# Patient Record
Sex: MALE | Race: WHITE | NOT HISPANIC OR LATINO | Employment: OTHER | URBAN - METROPOLITAN AREA
[De-identification: names, ages, dates, MRNs, and addresses within clinical notes are randomized per-mention and may not be internally consistent; named-entity substitution may affect disease eponyms.]

---

## 2017-01-18 ENCOUNTER — OFFICE VISIT (OUTPATIENT)
Dept: SURGERY | Facility: CLINIC | Age: 57
End: 2017-01-18
Payer: MEDICARE

## 2017-01-18 VITALS
HEIGHT: 68 IN | WEIGHT: 117.75 LBS | HEART RATE: 91 BPM | BODY MASS INDEX: 17.85 KG/M2 | SYSTOLIC BLOOD PRESSURE: 141 MMHG | DIASTOLIC BLOOD PRESSURE: 93 MMHG

## 2017-01-18 DIAGNOSIS — D12.8 ADENOMATOUS RECTAL POLYP: Primary | ICD-10-CM

## 2017-01-18 PROCEDURE — 99999 PR PBB SHADOW E&M-EST. PATIENT-LVL III: CPT | Mod: PBBFAC,,, | Performed by: COLON & RECTAL SURGERY

## 2017-01-18 PROCEDURE — 99024 POSTOP FOLLOW-UP VISIT: CPT | Mod: S$GLB,,, | Performed by: COLON & RECTAL SURGERY

## 2017-01-18 NOTE — PROGRESS NOTES
Patient ID:  Kvng Edwards is a 56 y.o. male     Chief Complaint:   Chief Complaint   Patient presents with    Post-op Evaluation        HPI:12/20/16 transanal excision Path adenoma. Doing well no bleeding       had screening colonsocy and had 3 adenomas reooved and a 5 cm rectal villous adenona was idebtified. Bx -adenoma  No symptoms    ROS:        Constitutional: No fever, chills, activity or appetite change.      HENT: No hearing loss, facial swelling, neck pain or stiffness.       Eyes: No discharge, itching and visual disturbance.      Respiratory: No apnea, cough, choking or shortness of breath.       Cardiovascular: No leg swelling or chest pain      Gastrointestinal: No abdominal distention or change in bowel habbits     Genitourinary: No dysuria, frequency or flank pain.      Musculoskeletal: No arthralgias or gait problem.      Neurological: No dizziness, seizures or weakness.      Hematological: No adenopathy.      Psychiatric/Behavioral: No hallucinations or behavioral problems.       PE:    APPEARANCE: Well nourished, well developed, in no acute distress.   CHEST: Lungs clear. Normal respiratory effort.  CARDIOVASCULAR: Normal rhythm. No edema.  ABDOMEN: Soft. No tenderness or masses.  Rectum:  Normal skin,    Musculoskeletal: Symmetric, normal range of motion and strength.   Neurological: Alert and oriented to person, place, and time. Normal reflexes.   Skin: Skin is warm and dry.   Psychiatric: Normal mood and affect. Behavior is normal and appropriate.     Impression: rectal villous adenoma  PLAN: RTC 3 months for FFS than c-scope in 1 year.

## 2017-04-19 ENCOUNTER — OFFICE VISIT (OUTPATIENT)
Dept: SURGERY | Facility: CLINIC | Age: 57
End: 2017-04-19
Payer: MEDICARE

## 2017-04-19 VITALS
WEIGHT: 119.69 LBS | HEIGHT: 68 IN | HEART RATE: 62 BPM | DIASTOLIC BLOOD PRESSURE: 80 MMHG | BODY MASS INDEX: 18.14 KG/M2 | SYSTOLIC BLOOD PRESSURE: 131 MMHG

## 2017-04-19 DIAGNOSIS — D12.8 ADENOMATOUS RECTAL POLYP: Primary | ICD-10-CM

## 2017-04-19 PROCEDURE — 99999 PR PBB SHADOW E&M-EST. PATIENT-LVL III: CPT | Mod: PBBFAC,,, | Performed by: COLON & RECTAL SURGERY

## 2017-04-19 PROCEDURE — 45330 DIAGNOSTIC SIGMOIDOSCOPY: CPT | Mod: S$GLB,,, | Performed by: COLON & RECTAL SURGERY

## 2017-04-19 PROCEDURE — 1160F RVW MEDS BY RX/DR IN RCRD: CPT | Mod: S$GLB,,, | Performed by: COLON & RECTAL SURGERY

## 2017-04-19 PROCEDURE — 99214 OFFICE O/P EST MOD 30 MIN: CPT | Mod: 25,S$GLB,, | Performed by: COLON & RECTAL SURGERY

## 2017-04-19 NOTE — PROGRESS NOTES
Patient ID:  Kvng Edwards is a 56 y.o. male     Chief Complaint:   Chief Complaint   Patient presents with    Follow-up        HPI: Doing well no bleeding    12/20/16 transanal excision Path adenoma.        had screening colonsocy and had 3 adenomas reooved and a 5 cm rectal villous adenona was idebtified. Bx -adenoma  No symptoms    ROS:        Constitutional: No fever, chills, activity or appetite change.      HENT: No hearing loss, facial swelling, neck pain or stiffness.       Eyes: No discharge, itching and visual disturbance.      Respiratory: No apnea, cough, choking or shortness of breath.       Cardiovascular: No leg swelling or chest pain      Gastrointestinal: No abdominal distention or change in bowel habbits     Genitourinary: No dysuria, frequency or flank pain.      Musculoskeletal: No arthralgias or gait problem.      Neurological: No dizziness, seizures or weakness.      Hematological: No adenopathy.      Psychiatric/Behavioral: No hallucinations or behavioral problems.       PE:    APPEARANCE: Well nourished, well developed, in no acute distress.   CHEST: Lungs clear. Normal respiratory effort.  CARDIOVASCULAR: Normal rhythm. No edema.  ABDOMEN: Soft. No tenderness or masses.  Rectum:  Normal skin,    Musculoskeletal: Symmetric, normal range of motion and strength.   Neurological: Alert and oriented to person, place, and time. Normal reflexes.   Skin: Skin is warm and dry.   Psychiatric: Normal mood and affect. Behavior is normal and appropriate.     PROCEDURE: Flexible Sigmoidoscopy   Scope # [id number]    With informed consent the flexible sigmoidoscope was passed 15 cm under direcet vision. Prep quality: good    Findings: healed scar no evidence of adenoma    EBL: None    The procedure was tolerated well.        Impression: Hx rectal villous adenoma  PLAN:   c-scope in 9 months

## 2017-05-24 ENCOUNTER — TELEPHONE (OUTPATIENT)
Dept: ENDOSCOPY | Facility: HOSPITAL | Age: 57
End: 2017-05-24

## 2018-03-27 ENCOUNTER — TELEPHONE (OUTPATIENT)
Dept: ENDOSCOPY | Facility: HOSPITAL | Age: 58
End: 2018-03-27

## 2021-05-18 ENCOUNTER — TELEPHONE (OUTPATIENT)
Dept: NEUROLOGY | Facility: CLINIC | Age: 61
End: 2021-05-18

## 2021-05-20 ENCOUNTER — TELEPHONE (OUTPATIENT)
Dept: NEUROLOGY | Facility: CLINIC | Age: 61
End: 2021-05-20

## 2021-09-16 ENCOUNTER — TELEPHONE (OUTPATIENT)
Dept: NEUROLOGY | Facility: CLINIC | Age: 61
End: 2021-09-16

## 2021-12-14 ENCOUNTER — HOSPITAL ENCOUNTER (INPATIENT)
Facility: HOSPITAL | Age: 61
LOS: 3 days | Discharge: HOME HEALTH | DRG: 054 | End: 2021-12-17
Attending: EMERGENCY MEDICINE | Admitting: PSYCHIATRY & NEUROLOGY
Payer: MEDICARE

## 2021-12-14 DIAGNOSIS — C34.90 LUNG CANCER METASTATIC TO BRAIN: ICD-10-CM

## 2021-12-14 DIAGNOSIS — I63.9 STROKE: ICD-10-CM

## 2021-12-14 DIAGNOSIS — Z01.89 ENCOUNTER FOR IMAGING TO SCREEN FOR METAL PRIOR TO MRI: ICD-10-CM

## 2021-12-14 DIAGNOSIS — G93.89 BRAIN MASS: ICD-10-CM

## 2021-12-14 DIAGNOSIS — C79.31 LUNG CANCER METASTATIC TO BRAIN: ICD-10-CM

## 2021-12-14 DIAGNOSIS — R56.9 SEIZURE: ICD-10-CM

## 2021-12-14 DIAGNOSIS — R40.20 LOC (LOSS OF CONSCIOUSNESS): Primary | ICD-10-CM

## 2021-12-14 DIAGNOSIS — C79.31 SECONDARY MALIGNANT NEOPLASM OF BRAIN: ICD-10-CM

## 2021-12-14 LAB
ALBUMIN SERPL BCP-MCNC: 3.3 G/DL (ref 3.5–5.2)
ALBUMIN SERPL BCP-MCNC: 3.6 G/DL (ref 3.5–5.2)
ALLENS TEST: ABNORMAL
ALP SERPL-CCNC: 59 U/L (ref 55–135)
ALP SERPL-CCNC: 70 U/L (ref 55–135)
ALT SERPL W/O P-5'-P-CCNC: 35 U/L (ref 10–44)
ALT SERPL W/O P-5'-P-CCNC: 39 U/L (ref 10–44)
ANION GAP SERPL CALC-SCNC: 13 MMOL/L (ref 8–16)
ANION GAP SERPL CALC-SCNC: 13 MMOL/L (ref 8–16)
AST SERPL-CCNC: 23 U/L (ref 10–40)
AST SERPL-CCNC: 36 U/L (ref 10–40)
BASOPHILS # BLD AUTO: 0.02 K/UL (ref 0–0.2)
BASOPHILS NFR BLD: 0.4 % (ref 0–1.9)
BILIRUB SERPL-MCNC: 0.4 MG/DL (ref 0.1–1)
BILIRUB SERPL-MCNC: 0.7 MG/DL (ref 0.1–1)
BUN SERPL-MCNC: 12 MG/DL (ref 6–20)
BUN SERPL-MCNC: 13 MG/DL (ref 6–20)
BUN SERPL-MCNC: 14 MG/DL (ref 6–30)
CALCIUM SERPL-MCNC: 8.7 MG/DL (ref 8.7–10.5)
CALCIUM SERPL-MCNC: 9.9 MG/DL (ref 8.7–10.5)
CHLORIDE SERPL-SCNC: 100 MMOL/L (ref 95–110)
CHLORIDE SERPL-SCNC: 100 MMOL/L (ref 95–110)
CHLORIDE SERPL-SCNC: 99 MMOL/L (ref 95–110)
CHOLEST SERPL-MCNC: 211 MG/DL (ref 120–199)
CHOLEST/HDLC SERPL: 2.7 {RATIO} (ref 2–5)
CO2 SERPL-SCNC: 18 MMOL/L (ref 23–29)
CO2 SERPL-SCNC: 20 MMOL/L (ref 23–29)
CREAT SERPL-MCNC: 0.5 MG/DL (ref 0.5–1.4)
CREAT SERPL-MCNC: 0.7 MG/DL (ref 0.5–1.4)
CREAT SERPL-MCNC: 0.7 MG/DL (ref 0.5–1.4)
CTP QC/QA: YES
DIFFERENTIAL METHOD: ABNORMAL
EOSINOPHIL # BLD AUTO: 0 K/UL (ref 0–0.5)
EOSINOPHIL NFR BLD: 0.2 % (ref 0–8)
ERYTHROCYTE [DISTWIDTH] IN BLOOD BY AUTOMATED COUNT: 15.1 % (ref 11.5–14.5)
EST. GFR  (AFRICAN AMERICAN): >60 ML/MIN/1.73 M^2
EST. GFR  (AFRICAN AMERICAN): >60 ML/MIN/1.73 M^2
EST. GFR  (NON AFRICAN AMERICAN): >60 ML/MIN/1.73 M^2
EST. GFR  (NON AFRICAN AMERICAN): >60 ML/MIN/1.73 M^2
GLUCOSE SERPL-MCNC: 126 MG/DL (ref 70–110)
GLUCOSE SERPL-MCNC: 136 MG/DL (ref 70–110)
GLUCOSE SERPL-MCNC: 136 MG/DL (ref 70–110)
HCO3 UR-SCNC: 17.2 MMOL/L (ref 24–28)
HCT VFR BLD AUTO: 42.7 % (ref 40–54)
HCT VFR BLD CALC: 36 %PCV (ref 36–54)
HCT VFR BLD CALC: 37 %PCV (ref 36–54)
HDLC SERPL-MCNC: 77 MG/DL (ref 40–75)
HDLC SERPL: 36.5 % (ref 20–50)
HGB BLD-MCNC: 13.4 G/DL (ref 14–18)
IMM GRANULOCYTES # BLD AUTO: 0.07 K/UL (ref 0–0.04)
IMM GRANULOCYTES NFR BLD AUTO: 1.5 % (ref 0–0.5)
INR PPP: 0.9 (ref 0.8–1.2)
LDLC SERPL CALC-MCNC: 122 MG/DL (ref 63–159)
LYMPHOCYTES # BLD AUTO: 1.5 K/UL (ref 1–4.8)
LYMPHOCYTES NFR BLD: 31.6 % (ref 18–48)
MCH RBC QN AUTO: 31.5 PG (ref 27–31)
MCHC RBC AUTO-ENTMCNC: 31.4 G/DL (ref 32–36)
MCV RBC AUTO: 100 FL (ref 82–98)
MONOCYTES # BLD AUTO: 0.2 K/UL (ref 0.3–1)
MONOCYTES NFR BLD: 4.7 % (ref 4–15)
NEUTROPHILS # BLD AUTO: 2.9 K/UL (ref 1.8–7.7)
NEUTROPHILS NFR BLD: 61.6 % (ref 38–73)
NONHDLC SERPL-MCNC: 134 MG/DL
NRBC BLD-RTO: 0 /100 WBC
PCO2 BLDA: 37.2 MMHG (ref 35–45)
PH SMN: 7.27 [PH] (ref 7.35–7.45)
PLATELET # BLD AUTO: 234 K/UL (ref 150–450)
PMV BLD AUTO: 11 FL (ref 9.2–12.9)
PO2 BLDA: 58 MMHG (ref 40–60)
POC BE: -10 MMOL/L
POC IONIZED CALCIUM: 1.19 MMOL/L (ref 1.06–1.42)
POC IONIZED CALCIUM: 1.2 MMOL/L (ref 1.06–1.42)
POC SATURATED O2: 86 % (ref 95–100)
POC TCO2 (MEASURED): 25 MMOL/L (ref 23–29)
POC TCO2: 18 MMOL/L (ref 24–29)
POCT GLUCOSE: 141 MG/DL (ref 70–110)
POTASSIUM BLD-SCNC: 4 MMOL/L (ref 3.5–5.1)
POTASSIUM BLD-SCNC: 5.4 MMOL/L (ref 3.5–5.1)
POTASSIUM SERPL-SCNC: 4.6 MMOL/L (ref 3.5–5.1)
POTASSIUM SERPL-SCNC: 5.1 MMOL/L (ref 3.5–5.1)
PROT SERPL-MCNC: 7 G/DL (ref 6–8.4)
PROT SERPL-MCNC: 7.8 G/DL (ref 6–8.4)
PROTHROMBIN TIME: 9.6 SEC (ref 9–12.5)
RBC # BLD AUTO: 4.26 M/UL (ref 4.6–6.2)
SAMPLE: ABNORMAL
SAMPLE: ABNORMAL
SARS-COV-2 RDRP RESP QL NAA+PROBE: NEGATIVE
SITE: ABNORMAL
SODIUM BLD-SCNC: 133 MMOL/L (ref 136–145)
SODIUM BLD-SCNC: 135 MMOL/L (ref 136–145)
SODIUM SERPL-SCNC: 131 MMOL/L (ref 136–145)
SODIUM SERPL-SCNC: 133 MMOL/L (ref 136–145)
TRIGL SERPL-MCNC: 60 MG/DL (ref 30–150)
TROPONIN I SERPL DL<=0.01 NG/ML-MCNC: 0.01 NG/ML (ref 0–0.03)
TSH SERPL DL<=0.005 MIU/L-ACNC: 3.15 UIU/ML (ref 0.4–4)
WBC # BLD AUTO: 4.68 K/UL (ref 3.9–12.7)

## 2021-12-14 PROCEDURE — 99291 CRITICAL CARE FIRST HOUR: CPT | Mod: CS,,, | Performed by: EMERGENCY MEDICINE

## 2021-12-14 PROCEDURE — 63600175 PHARM REV CODE 636 W HCPCS

## 2021-12-14 PROCEDURE — 99900035 HC TECH TIME PER 15 MIN (STAT)

## 2021-12-14 PROCEDURE — 25500020 PHARM REV CODE 255: Performed by: EMERGENCY MEDICINE

## 2021-12-14 PROCEDURE — 93010 EKG 12-LEAD: ICD-10-PCS | Mod: ,,, | Performed by: INTERNAL MEDICINE

## 2021-12-14 PROCEDURE — 80053 COMPREHEN METABOLIC PANEL: CPT | Mod: 91 | Performed by: STUDENT IN AN ORGANIZED HEALTH CARE EDUCATION/TRAINING PROGRAM

## 2021-12-14 PROCEDURE — 93005 ELECTROCARDIOGRAM TRACING: CPT

## 2021-12-14 PROCEDURE — 99291 PR CRITICAL CARE, E/M 30-74 MINUTES: ICD-10-PCS | Mod: CS,,, | Performed by: EMERGENCY MEDICINE

## 2021-12-14 PROCEDURE — 85025 COMPLETE CBC W/AUTO DIFF WBC: CPT | Performed by: EMERGENCY MEDICINE

## 2021-12-14 PROCEDURE — 96374 THER/PROPH/DIAG INJ IV PUSH: CPT

## 2021-12-14 PROCEDURE — 82803 BLOOD GASES ANY COMBINATION: CPT

## 2021-12-14 PROCEDURE — 85610 PROTHROMBIN TIME: CPT | Performed by: EMERGENCY MEDICINE

## 2021-12-14 PROCEDURE — 94760 N-INVAS EAR/PLS OXIMETRY 1: CPT

## 2021-12-14 PROCEDURE — 99223 PR INITIAL HOSPITAL CARE,LEVL III: ICD-10-PCS | Mod: ,,, | Performed by: NEUROLOGICAL SURGERY

## 2021-12-14 PROCEDURE — 84443 ASSAY THYROID STIM HORMONE: CPT | Performed by: EMERGENCY MEDICINE

## 2021-12-14 PROCEDURE — 63600175 PHARM REV CODE 636 W HCPCS: Performed by: STUDENT IN AN ORGANIZED HEALTH CARE EDUCATION/TRAINING PROGRAM

## 2021-12-14 PROCEDURE — 99223 1ST HOSP IP/OBS HIGH 75: CPT | Mod: ,,, | Performed by: NEUROLOGICAL SURGERY

## 2021-12-14 PROCEDURE — 20000000 HC ICU ROOM

## 2021-12-14 PROCEDURE — 93010 ELECTROCARDIOGRAM REPORT: CPT | Mod: ,,, | Performed by: INTERNAL MEDICINE

## 2021-12-14 PROCEDURE — 84484 ASSAY OF TROPONIN QUANT: CPT | Performed by: EMERGENCY MEDICINE

## 2021-12-14 PROCEDURE — U0002 COVID-19 LAB TEST NON-CDC: HCPCS | Performed by: STUDENT IN AN ORGANIZED HEALTH CARE EDUCATION/TRAINING PROGRAM

## 2021-12-14 PROCEDURE — A9585 GADOBUTROL INJECTION: HCPCS | Performed by: EMERGENCY MEDICINE

## 2021-12-14 PROCEDURE — 25000003 PHARM REV CODE 250

## 2021-12-14 PROCEDURE — 25000003 PHARM REV CODE 250: Performed by: STUDENT IN AN ORGANIZED HEALTH CARE EDUCATION/TRAINING PROGRAM

## 2021-12-14 PROCEDURE — 63600175 PHARM REV CODE 636 W HCPCS: Performed by: EMERGENCY MEDICINE

## 2021-12-14 PROCEDURE — 99291 PR CRITICAL CARE, E/M 30-74 MINUTES: ICD-10-PCS | Mod: ,,, | Performed by: PSYCHIATRY & NEUROLOGY

## 2021-12-14 PROCEDURE — 96375 TX/PRO/DX INJ NEW DRUG ADDON: CPT

## 2021-12-14 PROCEDURE — 99291 CRITICAL CARE FIRST HOUR: CPT | Mod: ,,, | Performed by: PSYCHIATRY & NEUROLOGY

## 2021-12-14 PROCEDURE — 80053 COMPREHEN METABOLIC PANEL: CPT | Performed by: EMERGENCY MEDICINE

## 2021-12-14 PROCEDURE — 96376 TX/PRO/DX INJ SAME DRUG ADON: CPT

## 2021-12-14 PROCEDURE — 99291 CRITICAL CARE FIRST HOUR: CPT | Mod: 25

## 2021-12-14 PROCEDURE — 80061 LIPID PANEL: CPT | Performed by: EMERGENCY MEDICINE

## 2021-12-14 RX ORDER — ALPRAZOLAM 1 MG/1
1 TABLET ORAL NIGHTLY PRN
COMMUNITY
Start: 2021-10-18 | End: 2022-01-03 | Stop reason: SDUPTHER

## 2021-12-14 RX ORDER — GADOBUTROL 604.72 MG/ML
8 INJECTION INTRAVENOUS
Status: COMPLETED | OUTPATIENT
Start: 2021-12-14 | End: 2021-12-14

## 2021-12-14 RX ORDER — LEVETIRACETAM 500 MG/5ML
1000 INJECTION, SOLUTION, CONCENTRATE INTRAVENOUS ONCE
Status: DISCONTINUED | OUTPATIENT
Start: 2021-12-14 | End: 2021-12-14

## 2021-12-14 RX ORDER — LEVETIRACETAM 500 MG/1
500 TABLET ORAL 2 TIMES DAILY
Status: DISCONTINUED | OUTPATIENT
Start: 2021-12-14 | End: 2021-12-14 | Stop reason: ALTCHOICE

## 2021-12-14 RX ORDER — SODIUM CHLORIDE 9 MG/ML
INJECTION, SOLUTION INTRAVENOUS CONTINUOUS
Status: ACTIVE | OUTPATIENT
Start: 2021-12-14 | End: 2021-12-15

## 2021-12-14 RX ORDER — DEXAMETHASONE 2 MG/1
4 TABLET ORAL EVERY MORNING
Status: ON HOLD | COMMUNITY
Start: 2021-11-16 | End: 2021-12-17 | Stop reason: HOSPADM

## 2021-12-14 RX ORDER — HYDROCODONE BITARTRATE AND ACETAMINOPHEN 10; 325 MG/1; MG/1
1 TABLET ORAL EVERY 8 HOURS PRN
Status: ON HOLD | COMMUNITY
End: 2021-12-17 | Stop reason: HOSPADM

## 2021-12-14 RX ORDER — LEVETIRACETAM 500 MG/5ML
500 INJECTION, SOLUTION, CONCENTRATE INTRAVENOUS EVERY 12 HOURS
Status: DISCONTINUED | OUTPATIENT
Start: 2021-12-14 | End: 2021-12-16

## 2021-12-14 RX ORDER — FAMOTIDINE 20 MG/1
20 TABLET, FILM COATED ORAL 2 TIMES DAILY
COMMUNITY
Start: 2021-11-18

## 2021-12-14 RX ORDER — LORAZEPAM 2 MG/ML
1 INJECTION INTRAMUSCULAR
Status: COMPLETED | OUTPATIENT
Start: 2021-12-14 | End: 2021-12-14

## 2021-12-14 RX ORDER — LEVETIRACETAM 500 MG/1
1000 TABLET ORAL ONCE
Status: COMPLETED | OUTPATIENT
Start: 2021-12-14 | End: 2021-12-14

## 2021-12-14 RX ORDER — LEVETIRACETAM 500 MG/5ML
1000 INJECTION, SOLUTION, CONCENTRATE INTRAVENOUS ONCE
Status: COMPLETED | OUTPATIENT
Start: 2021-12-14 | End: 2021-12-14

## 2021-12-14 RX ORDER — DEXAMETHASONE SODIUM PHOSPHATE 4 MG/ML
10 INJECTION, SOLUTION INTRA-ARTICULAR; INTRALESIONAL; INTRAMUSCULAR; INTRAVENOUS; SOFT TISSUE ONCE
Status: COMPLETED | OUTPATIENT
Start: 2021-12-14 | End: 2021-12-14

## 2021-12-14 RX ORDER — LABETALOL HCL 20 MG/4 ML
10 SYRINGE (ML) INTRAVENOUS EVERY 6 HOURS PRN
Status: DISCONTINUED | OUTPATIENT
Start: 2021-12-14 | End: 2021-12-16

## 2021-12-14 RX ORDER — DEXAMETHASONE SODIUM PHOSPHATE 4 MG/ML
4 INJECTION, SOLUTION INTRA-ARTICULAR; INTRALESIONAL; INTRAMUSCULAR; INTRAVENOUS; SOFT TISSUE EVERY 6 HOURS
Status: DISCONTINUED | OUTPATIENT
Start: 2021-12-14 | End: 2021-12-17 | Stop reason: HOSPADM

## 2021-12-14 RX ORDER — CHLORZOXAZONE 500 MG/1
1 TABLET ORAL NIGHTLY PRN
Status: ON HOLD | COMMUNITY
End: 2021-12-17 | Stop reason: HOSPADM

## 2021-12-14 RX ADMIN — LORAZEPAM 1 MG: 2 INJECTION INTRAMUSCULAR; INTRAVENOUS at 04:12

## 2021-12-14 RX ADMIN — LEVETIRACETAM 1000 MG: 500 TABLET, FILM COATED ORAL at 09:12

## 2021-12-14 RX ADMIN — DEXAMETHASONE SODIUM PHOSPHATE 10 MG: 4 INJECTION INTRA-ARTICULAR; INTRALESIONAL; INTRAMUSCULAR; INTRAVENOUS; SOFT TISSUE at 09:12

## 2021-12-14 RX ADMIN — LEVETIRACETAM 1000 MG: 500 INJECTION, SOLUTION INTRAVENOUS at 10:12

## 2021-12-14 RX ADMIN — SODIUM CHLORIDE: 0.9 INJECTION, SOLUTION INTRAVENOUS at 07:12

## 2021-12-14 RX ADMIN — DEXAMETHASONE SODIUM PHOSPHATE 4 MG: 4 INJECTION INTRA-ARTICULAR; INTRALESIONAL; INTRAMUSCULAR; INTRAVENOUS; SOFT TISSUE at 11:12

## 2021-12-14 RX ADMIN — LEVETIRACETAM 500 MG: 500 INJECTION, SOLUTION INTRAVENOUS at 10:12

## 2021-12-14 RX ADMIN — DEXAMETHASONE SODIUM PHOSPHATE 4 MG: 4 INJECTION INTRA-ARTICULAR; INTRALESIONAL; INTRAMUSCULAR; INTRAVENOUS; SOFT TISSUE at 05:12

## 2021-12-14 RX ADMIN — GADOBUTROL 8 ML: 604.72 INJECTION INTRAVENOUS at 11:12

## 2021-12-15 ENCOUNTER — DOCUMENTATION ONLY (OUTPATIENT)
Dept: HEPATOLOGY | Facility: HOSPITAL | Age: 61
End: 2021-12-15
Payer: MEDICARE

## 2021-12-15 LAB
AV INDEX (PROSTH): 0.99
AV MEAN GRADIENT: 3 MMHG
AV PEAK GRADIENT: 6 MMHG
AV VALVE AREA: 3.09 CM2
AV VELOCITY RATIO: 0.97
BSA FOR ECHO PROCEDURE: 1.68 M2
CV ECHO LV RWT: 0.46 CM
DOP CALC AO PEAK VEL: 1.22 M/S
DOP CALC AO VTI: 18.64 CM
DOP CALC LVOT AREA: 3.1 CM2
DOP CALC LVOT DIAMETER: 1.99 CM
DOP CALC LVOT PEAK VEL: 1.18 M/S
DOP CALC LVOT STROKE VOLUME: 57.51 CM3
DOP CALCLVOT PEAK VEL VTI: 18.5 CM
E WAVE DECELERATION TIME: 169.13 MSEC
E/A RATIO: 0.87
E/E' RATIO: 7.06 M/S
ECHO LV POSTERIOR WALL: 0.8 CM (ref 0.6–1.1)
EJECTION FRACTION: 55 %
FRACTIONAL SHORTENING: 32 % (ref 28–44)
INTERVENTRICULAR SEPTUM: 0.81 CM (ref 0.6–1.1)
LA MAJOR: 4.5 CM
LA MINOR: 4.97 CM
LA WIDTH: 3.22 CM
LEFT ATRIUM SIZE: 2.2 CM
LEFT ATRIUM VOLUME INDEX MOD: 12.7 ML/M2
LEFT ATRIUM VOLUME INDEX: 16.7 ML/M2
LEFT ATRIUM VOLUME MOD: 21.52 CM3
LEFT ATRIUM VOLUME: 28.44 CM3
LEFT INTERNAL DIMENSION IN SYSTOLE: 2.37 CM (ref 2.1–4)
LEFT VENTRICLE DIASTOLIC VOLUME INDEX: 29.02 ML/M2
LEFT VENTRICLE DIASTOLIC VOLUME: 49.33 ML
LEFT VENTRICLE MASS INDEX: 44 G/M2
LEFT VENTRICLE SYSTOLIC VOLUME INDEX: 11.5 ML/M2
LEFT VENTRICLE SYSTOLIC VOLUME: 19.48 ML
LEFT VENTRICULAR INTERNAL DIMENSION IN DIASTOLE: 3.46 CM (ref 3.5–6)
LEFT VENTRICULAR MASS: 74.57 G
LV LATERAL E/E' RATIO: 6 M/S
LV SEPTAL E/E' RATIO: 8.57 M/S
MV PEAK A VEL: 0.69 M/S
MV PEAK E VEL: 0.6 M/S
MV STENOSIS PRESSURE HALF TIME: 49.05 MS
MV VALVE AREA P 1/2 METHOD: 4.49 CM2
PISA TR MAX VEL: 1.13 M/S
PULM VEIN S/D RATIO: 1.46
PV PEAK D VEL: 0.57 M/S
PV PEAK S VEL: 0.83 M/S
RA MAJOR: 3 CM
RA PRESSURE: 3 MMHG
RA WIDTH: 3.04 CM
RIGHT VENTRICULAR END-DIASTOLIC DIMENSION: 2.57 CM
RV TISSUE DOPPLER FREE WALL SYSTOLIC VELOCITY 1 (APICAL 4 CHAMBER VIEW): 22.96 CM/S
SINUS: 3 CM
TDI LATERAL: 0.1 M/S
TDI SEPTAL: 0.07 M/S
TDI: 0.09 M/S
TR MAX PG: 5 MMHG
TRICUSPID ANNULAR PLANE SYSTOLIC EXCURSION: 1.68 CM
TV REST PULMONARY ARTERY PRESSURE: 8 MMHG

## 2021-12-15 PROCEDURE — 25000003 PHARM REV CODE 250: Performed by: PSYCHIATRY & NEUROLOGY

## 2021-12-15 PROCEDURE — 99233 PR SUBSEQUENT HOSPITAL CARE,LEVL III: ICD-10-PCS | Mod: ,,, | Performed by: PSYCHIATRY & NEUROLOGY

## 2021-12-15 PROCEDURE — 11000001 HC ACUTE MED/SURG PRIVATE ROOM

## 2021-12-15 PROCEDURE — 25500020 PHARM REV CODE 255: Performed by: PSYCHIATRY & NEUROLOGY

## 2021-12-15 PROCEDURE — 99233 SBSQ HOSP IP/OBS HIGH 50: CPT | Mod: ,,, | Performed by: NEUROLOGICAL SURGERY

## 2021-12-15 PROCEDURE — 99233 SBSQ HOSP IP/OBS HIGH 50: CPT | Mod: ,,, | Performed by: PSYCHIATRY & NEUROLOGY

## 2021-12-15 PROCEDURE — 94761 N-INVAS EAR/PLS OXIMETRY MLT: CPT

## 2021-12-15 PROCEDURE — 95813 EEG EXTND MNTR 61-119 MIN: CPT | Mod: 26,,, | Performed by: PSYCHIATRY & NEUROLOGY

## 2021-12-15 PROCEDURE — 95813 PR EEG, EXTENDED, 61-119 MINS: ICD-10-PCS | Mod: 26,,, | Performed by: PSYCHIATRY & NEUROLOGY

## 2021-12-15 PROCEDURE — 25000003 PHARM REV CODE 250: Performed by: STUDENT IN AN ORGANIZED HEALTH CARE EDUCATION/TRAINING PROGRAM

## 2021-12-15 PROCEDURE — 25500020 PHARM REV CODE 255: Performed by: STUDENT IN AN ORGANIZED HEALTH CARE EDUCATION/TRAINING PROGRAM

## 2021-12-15 PROCEDURE — 95813 EEG EXTND MNTR 61-119 MIN: CPT

## 2021-12-15 PROCEDURE — 63600175 PHARM REV CODE 636 W HCPCS: Performed by: STUDENT IN AN ORGANIZED HEALTH CARE EDUCATION/TRAINING PROGRAM

## 2021-12-15 PROCEDURE — 99233 PR SUBSEQUENT HOSPITAL CARE,LEVL III: ICD-10-PCS | Mod: ,,, | Performed by: NEUROLOGICAL SURGERY

## 2021-12-15 PROCEDURE — 25000003 PHARM REV CODE 250: Performed by: HOSPITALIST

## 2021-12-15 RX ORDER — ALPRAZOLAM 1 MG/1
1 TABLET ORAL NIGHTLY PRN
Status: DISCONTINUED | OUTPATIENT
Start: 2021-12-15 | End: 2021-12-17 | Stop reason: HOSPADM

## 2021-12-15 RX ORDER — AMOXICILLIN 250 MG
1 CAPSULE ORAL DAILY
Status: DISCONTINUED | OUTPATIENT
Start: 2021-12-15 | End: 2021-12-17 | Stop reason: HOSPADM

## 2021-12-15 RX ORDER — ACETAMINOPHEN 325 MG/1
650 TABLET ORAL EVERY 6 HOURS PRN
Status: DISCONTINUED | OUTPATIENT
Start: 2021-12-15 | End: 2021-12-17 | Stop reason: HOSPADM

## 2021-12-15 RX ORDER — MUPIROCIN 20 MG/G
OINTMENT TOPICAL 2 TIMES DAILY
Status: DISCONTINUED | OUTPATIENT
Start: 2021-12-15 | End: 2021-12-17 | Stop reason: HOSPADM

## 2021-12-15 RX ORDER — FAMOTIDINE 20 MG/1
20 TABLET, FILM COATED ORAL 2 TIMES DAILY
Status: DISCONTINUED | OUTPATIENT
Start: 2021-12-15 | End: 2021-12-17 | Stop reason: HOSPADM

## 2021-12-15 RX ADMIN — ALPRAZOLAM 1 MG: 1 TABLET ORAL at 08:12

## 2021-12-15 RX ADMIN — LEVETIRACETAM 500 MG: 500 INJECTION, SOLUTION INTRAVENOUS at 08:12

## 2021-12-15 RX ADMIN — FAMOTIDINE 20 MG: 20 TABLET ORAL at 12:12

## 2021-12-15 RX ADMIN — MUPIROCIN 1 G: 20 OINTMENT TOPICAL at 12:12

## 2021-12-15 RX ADMIN — FAMOTIDINE 20 MG: 20 TABLET ORAL at 08:12

## 2021-12-15 RX ADMIN — DEXAMETHASONE SODIUM PHOSPHATE 4 MG: 4 INJECTION INTRA-ARTICULAR; INTRALESIONAL; INTRAMUSCULAR; INTRAVENOUS; SOFT TISSUE at 05:12

## 2021-12-15 RX ADMIN — MUPIROCIN: 20 OINTMENT TOPICAL at 08:12

## 2021-12-15 RX ADMIN — LEVETIRACETAM 500 MG: 500 INJECTION, SOLUTION INTRAVENOUS at 09:12

## 2021-12-15 RX ADMIN — IOHEXOL 15 ML: 350 INJECTION, SOLUTION INTRAVENOUS at 08:12

## 2021-12-15 RX ADMIN — DEXAMETHASONE SODIUM PHOSPHATE 4 MG: 4 INJECTION INTRA-ARTICULAR; INTRALESIONAL; INTRAMUSCULAR; INTRAVENOUS; SOFT TISSUE at 12:12

## 2021-12-15 RX ADMIN — DOCUSATE SODIUM AND SENNOSIDES 1 TABLET: 8.6; 5 TABLET, FILM COATED ORAL at 12:12

## 2021-12-15 RX ADMIN — IOHEXOL 75 ML: 350 INJECTION, SOLUTION INTRAVENOUS at 11:12

## 2021-12-16 ENCOUNTER — TELEPHONE (OUTPATIENT)
Dept: HEMATOLOGY/ONCOLOGY | Facility: CLINIC | Age: 61
End: 2021-12-16
Payer: MEDICARE

## 2021-12-16 PROBLEM — Z71.89 ACP (ADVANCE CARE PLANNING): Status: ACTIVE | Noted: 2021-12-16

## 2021-12-16 PROBLEM — Z51.5 PALLIATIVE CARE ENCOUNTER: Status: ACTIVE | Noted: 2021-12-16

## 2021-12-16 PROBLEM — C79.31 SECONDARY MALIGNANT NEOPLASM OF BRAIN: Status: ACTIVE | Noted: 2021-12-14

## 2021-12-16 LAB — POCT GLUCOSE: 168 MG/DL (ref 70–110)

## 2021-12-16 PROCEDURE — 11000001 HC ACUTE MED/SURG PRIVATE ROOM

## 2021-12-16 PROCEDURE — 99233 PR SUBSEQUENT HOSPITAL CARE,LEVL III: ICD-10-PCS | Mod: ,,, | Performed by: PHYSICIAN ASSISTANT

## 2021-12-16 PROCEDURE — 25000003 PHARM REV CODE 250

## 2021-12-16 PROCEDURE — 94761 N-INVAS EAR/PLS OXIMETRY MLT: CPT

## 2021-12-16 PROCEDURE — 25000003 PHARM REV CODE 250: Performed by: HOSPITALIST

## 2021-12-16 PROCEDURE — 99222 PR INITIAL HOSPITAL CARE,LEVL II: ICD-10-PCS | Mod: ,,, | Performed by: INTERNAL MEDICINE

## 2021-12-16 PROCEDURE — 99233 PR SUBSEQUENT HOSPITAL CARE,LEVL III: ICD-10-PCS | Mod: ,,, | Performed by: STUDENT IN AN ORGANIZED HEALTH CARE EDUCATION/TRAINING PROGRAM

## 2021-12-16 PROCEDURE — 63600175 PHARM REV CODE 636 W HCPCS: Performed by: STUDENT IN AN ORGANIZED HEALTH CARE EDUCATION/TRAINING PROGRAM

## 2021-12-16 PROCEDURE — 99223 1ST HOSP IP/OBS HIGH 75: CPT | Mod: ,,, | Performed by: INTERNAL MEDICINE

## 2021-12-16 PROCEDURE — 25000003 PHARM REV CODE 250: Performed by: STUDENT IN AN ORGANIZED HEALTH CARE EDUCATION/TRAINING PROGRAM

## 2021-12-16 PROCEDURE — 99233 SBSQ HOSP IP/OBS HIGH 50: CPT | Mod: ,,, | Performed by: STUDENT IN AN ORGANIZED HEALTH CARE EDUCATION/TRAINING PROGRAM

## 2021-12-16 PROCEDURE — 99223 PR INITIAL HOSPITAL CARE,LEVL III: ICD-10-PCS | Mod: ,,, | Performed by: INTERNAL MEDICINE

## 2021-12-16 PROCEDURE — 63600175 PHARM REV CODE 636 W HCPCS

## 2021-12-16 PROCEDURE — 99233 SBSQ HOSP IP/OBS HIGH 50: CPT | Mod: ,,, | Performed by: PHYSICIAN ASSISTANT

## 2021-12-16 PROCEDURE — 99222 1ST HOSP IP/OBS MODERATE 55: CPT | Mod: ,,, | Performed by: INTERNAL MEDICINE

## 2021-12-16 RX ORDER — LEVETIRACETAM 500 MG/1
500 TABLET ORAL 2 TIMES DAILY
Status: DISCONTINUED | OUTPATIENT
Start: 2021-12-16 | End: 2021-12-17 | Stop reason: HOSPADM

## 2021-12-16 RX ADMIN — SODIUM CHLORIDE 500 ML: 0.9 INJECTION, SOLUTION INTRAVENOUS at 03:12

## 2021-12-16 RX ADMIN — FAMOTIDINE 20 MG: 20 TABLET ORAL at 08:12

## 2021-12-16 RX ADMIN — DEXAMETHASONE SODIUM PHOSPHATE 4 MG: 4 INJECTION INTRA-ARTICULAR; INTRALESIONAL; INTRAMUSCULAR; INTRAVENOUS; SOFT TISSUE at 05:12

## 2021-12-16 RX ADMIN — DEXAMETHASONE SODIUM PHOSPHATE 4 MG: 4 INJECTION INTRA-ARTICULAR; INTRALESIONAL; INTRAMUSCULAR; INTRAVENOUS; SOFT TISSUE at 06:12

## 2021-12-16 RX ADMIN — LEVETIRACETAM 500 MG: 500 TABLET, FILM COATED ORAL at 08:12

## 2021-12-16 RX ADMIN — DEXAMETHASONE SODIUM PHOSPHATE 4 MG: 4 INJECTION INTRA-ARTICULAR; INTRALESIONAL; INTRAMUSCULAR; INTRAVENOUS; SOFT TISSUE at 12:12

## 2021-12-16 RX ADMIN — DEXAMETHASONE SODIUM PHOSPHATE 4 MG: 4 INJECTION INTRA-ARTICULAR; INTRALESIONAL; INTRAMUSCULAR; INTRAVENOUS; SOFT TISSUE at 01:12

## 2021-12-16 RX ADMIN — ALPRAZOLAM 1 MG: 1 TABLET ORAL at 06:12

## 2021-12-16 RX ADMIN — MUPIROCIN: 20 OINTMENT TOPICAL at 08:12

## 2021-12-16 RX ADMIN — LEVETIRACETAM 500 MG: 500 INJECTION, SOLUTION INTRAVENOUS at 08:12

## 2021-12-17 VITALS
TEMPERATURE: 96 F | WEIGHT: 130 LBS | HEIGHT: 68 IN | HEART RATE: 98 BPM | RESPIRATION RATE: 16 BRPM | BODY MASS INDEX: 19.7 KG/M2 | OXYGEN SATURATION: 100 % | SYSTOLIC BLOOD PRESSURE: 155 MMHG | DIASTOLIC BLOOD PRESSURE: 90 MMHG

## 2021-12-17 PROCEDURE — 25000003 PHARM REV CODE 250: Performed by: STUDENT IN AN ORGANIZED HEALTH CARE EDUCATION/TRAINING PROGRAM

## 2021-12-17 PROCEDURE — 25000003 PHARM REV CODE 250

## 2021-12-17 PROCEDURE — 97161 PT EVAL LOW COMPLEX 20 MIN: CPT

## 2021-12-17 PROCEDURE — 99239 HOSP IP/OBS DSCHRG MGMT >30: CPT | Mod: ,,, | Performed by: STUDENT IN AN ORGANIZED HEALTH CARE EDUCATION/TRAINING PROGRAM

## 2021-12-17 PROCEDURE — 99232 PR SUBSEQUENT HOSPITAL CARE,LEVL II: ICD-10-PCS | Mod: GC,,, | Performed by: INTERNAL MEDICINE

## 2021-12-17 PROCEDURE — 99239 PR HOSPITAL DISCHARGE DAY,>30 MIN: ICD-10-PCS | Mod: ,,, | Performed by: STUDENT IN AN ORGANIZED HEALTH CARE EDUCATION/TRAINING PROGRAM

## 2021-12-17 PROCEDURE — 97165 OT EVAL LOW COMPLEX 30 MIN: CPT

## 2021-12-17 PROCEDURE — 97530 THERAPEUTIC ACTIVITIES: CPT

## 2021-12-17 PROCEDURE — 99232 SBSQ HOSP IP/OBS MODERATE 35: CPT | Mod: GC,,, | Performed by: INTERNAL MEDICINE

## 2021-12-17 PROCEDURE — 63600175 PHARM REV CODE 636 W HCPCS

## 2021-12-17 RX ORDER — LEVETIRACETAM 500 MG/1
500 TABLET ORAL 2 TIMES DAILY
Qty: 60 TABLET | Refills: 11 | Status: SHIPPED | OUTPATIENT
Start: 2021-12-17 | End: 2022-12-17

## 2021-12-17 RX ORDER — DEXAMETHASONE 4 MG/1
TABLET ORAL
Qty: 24 TABLET | Refills: 0 | Status: SHIPPED | OUTPATIENT
Start: 2021-12-17 | End: 2021-12-29

## 2021-12-17 RX ORDER — HYDROCODONE BITARTRATE AND ACETAMINOPHEN 5; 325 MG/1; MG/1
1 TABLET ORAL ONCE
Status: COMPLETED | OUTPATIENT
Start: 2021-12-17 | End: 2021-12-17

## 2021-12-17 RX ADMIN — DEXAMETHASONE SODIUM PHOSPHATE 4 MG: 4 INJECTION INTRA-ARTICULAR; INTRALESIONAL; INTRAMUSCULAR; INTRAVENOUS; SOFT TISSUE at 08:12

## 2021-12-17 RX ADMIN — DEXAMETHASONE SODIUM PHOSPHATE 4 MG: 4 INJECTION INTRA-ARTICULAR; INTRALESIONAL; INTRAMUSCULAR; INTRAVENOUS; SOFT TISSUE at 01:12

## 2021-12-17 RX ADMIN — ACETAMINOPHEN 650 MG: 325 TABLET ORAL at 09:12

## 2021-12-17 RX ADMIN — DEXAMETHASONE SODIUM PHOSPHATE 4 MG: 4 INJECTION INTRA-ARTICULAR; INTRALESIONAL; INTRAMUSCULAR; INTRAVENOUS; SOFT TISSUE at 12:12

## 2021-12-17 RX ADMIN — FAMOTIDINE 20 MG: 20 TABLET ORAL at 08:12

## 2021-12-17 RX ADMIN — HYDROCODONE BITARTRATE AND ACETAMINOPHEN 1 TABLET: 5; 325 TABLET ORAL at 11:12

## 2021-12-17 RX ADMIN — LEVETIRACETAM 500 MG: 500 TABLET, FILM COATED ORAL at 08:12

## 2021-12-17 RX ADMIN — MUPIROCIN: 20 OINTMENT TOPICAL at 08:12

## 2021-12-27 ENCOUNTER — OFFICE VISIT (OUTPATIENT)
Dept: HEMATOLOGY/ONCOLOGY | Facility: CLINIC | Age: 61
End: 2021-12-27
Payer: MEDICARE

## 2021-12-27 ENCOUNTER — LAB VISIT (OUTPATIENT)
Dept: LAB | Facility: HOSPITAL | Age: 61
End: 2021-12-27
Attending: STUDENT IN AN ORGANIZED HEALTH CARE EDUCATION/TRAINING PROGRAM
Payer: MEDICARE

## 2021-12-27 VITALS
BODY MASS INDEX: 19.37 KG/M2 | HEIGHT: 67 IN | OXYGEN SATURATION: 96 % | TEMPERATURE: 98 F | RESPIRATION RATE: 18 BRPM | DIASTOLIC BLOOD PRESSURE: 88 MMHG | WEIGHT: 123.44 LBS | SYSTOLIC BLOOD PRESSURE: 149 MMHG | HEART RATE: 134 BPM

## 2021-12-27 DIAGNOSIS — C34.90 PRIMARY ADENOCARCINOMA OF LUNG, UNSPECIFIED LATERALITY: ICD-10-CM

## 2021-12-27 DIAGNOSIS — C79.31 SECONDARY MALIGNANT NEOPLASM OF BRAIN: ICD-10-CM

## 2021-12-27 DIAGNOSIS — C34.90 PRIMARY ADENOCARCINOMA OF LUNG, UNSPECIFIED LATERALITY: Primary | ICD-10-CM

## 2021-12-27 DIAGNOSIS — C34.91 PRIMARY ADENOCARCINOMA OF RIGHT LUNG: Primary | ICD-10-CM

## 2021-12-27 DIAGNOSIS — R56.9 SEIZURE: ICD-10-CM

## 2021-12-27 PROCEDURE — 3008F PR BODY MASS INDEX (BMI) DOCUMENTED: ICD-10-PCS | Mod: CPTII,GC,S$GLB, | Performed by: STUDENT IN AN ORGANIZED HEALTH CARE EDUCATION/TRAINING PROGRAM

## 2021-12-27 PROCEDURE — 3079F PR MOST RECENT DIASTOLIC BLOOD PRESSURE 80-89 MM HG: ICD-10-PCS | Mod: CPTII,GC,S$GLB, | Performed by: STUDENT IN AN ORGANIZED HEALTH CARE EDUCATION/TRAINING PROGRAM

## 2021-12-27 PROCEDURE — 3077F PR MOST RECENT SYSTOLIC BLOOD PRESSURE >= 140 MM HG: ICD-10-PCS | Mod: CPTII,GC,S$GLB, | Performed by: STUDENT IN AN ORGANIZED HEALTH CARE EDUCATION/TRAINING PROGRAM

## 2021-12-27 PROCEDURE — 99999 PR PBB SHADOW E&M-EST. PATIENT-LVL IV: CPT | Mod: PBBFAC,GC,, | Performed by: STUDENT IN AN ORGANIZED HEALTH CARE EDUCATION/TRAINING PROGRAM

## 2021-12-27 PROCEDURE — 36415 COLL VENOUS BLD VENIPUNCTURE: CPT | Performed by: STUDENT IN AN ORGANIZED HEALTH CARE EDUCATION/TRAINING PROGRAM

## 2021-12-27 PROCEDURE — 99215 PR OFFICE/OUTPT VISIT, EST, LEVL V, 40-54 MIN: ICD-10-PCS | Mod: GC,S$GLB,, | Performed by: STUDENT IN AN ORGANIZED HEALTH CARE EDUCATION/TRAINING PROGRAM

## 2021-12-27 PROCEDURE — 99999 PR PBB SHADOW E&M-EST. PATIENT-LVL IV: ICD-10-PCS | Mod: PBBFAC,GC,, | Performed by: STUDENT IN AN ORGANIZED HEALTH CARE EDUCATION/TRAINING PROGRAM

## 2021-12-27 PROCEDURE — 3077F SYST BP >= 140 MM HG: CPT | Mod: CPTII,GC,S$GLB, | Performed by: STUDENT IN AN ORGANIZED HEALTH CARE EDUCATION/TRAINING PROGRAM

## 2021-12-27 PROCEDURE — 3008F BODY MASS INDEX DOCD: CPT | Mod: CPTII,GC,S$GLB, | Performed by: STUDENT IN AN ORGANIZED HEALTH CARE EDUCATION/TRAINING PROGRAM

## 2021-12-27 PROCEDURE — 99215 OFFICE O/P EST HI 40 MIN: CPT | Mod: GC,S$GLB,, | Performed by: STUDENT IN AN ORGANIZED HEALTH CARE EDUCATION/TRAINING PROGRAM

## 2021-12-27 PROCEDURE — 3079F DIAST BP 80-89 MM HG: CPT | Mod: CPTII,GC,S$GLB, | Performed by: STUDENT IN AN ORGANIZED HEALTH CARE EDUCATION/TRAINING PROGRAM

## 2021-12-27 PROCEDURE — 1111F PR DISCHARGE MEDS RECONCILED W/ CURRENT OUTPATIENT MED LIST: ICD-10-PCS | Mod: CPTII,GC,S$GLB, | Performed by: STUDENT IN AN ORGANIZED HEALTH CARE EDUCATION/TRAINING PROGRAM

## 2021-12-27 PROCEDURE — 1111F DSCHRG MED/CURRENT MED MERGE: CPT | Mod: CPTII,GC,S$GLB, | Performed by: STUDENT IN AN ORGANIZED HEALTH CARE EDUCATION/TRAINING PROGRAM

## 2021-12-27 NOTE — PROGRESS NOTES
HEMATOLOGY ONCOLOGY FELLOW CLINIC    HISTORY OF PRESENT ILLNESS:      Mr. Kvng Edwards is a 61 year old male with metastatic lung adenocarcinoma who presents to transfer his [place] care from [facility] [place] [facility].     Mr. Edwards was recently admitted from 12/14/21-12/17/21. He was in the hospital visiting his significant other, who is also hospitalized, when a loud thump was heard in the hospital bathroom. The nurse opened the bathroom door and found him seizing. He was brought to the ED and MRI brain revealed multiple intracranial masses concerning for worsening metastatic disease.    Mr. Edwards was diagnosed with lung cancer in February 2021. He was treated with concurrent chemoradiation at [facility] by Dr. Charles, which was completed on 5/6/2021. He was then started on maintenance durvalumab. He then developed symptoms of right hand and leg weakness and poor coordination, which prompted brain imaging. MRI brain showed a 5.2cm mass as well as three other lesions in the left middle fontal gyrus, left precentral gyrus, left occipital lobe. CT chest was concerning for progressive disease in the bilateral lungs. He underwent surgical resection of the 5.2cm mass on 8/5/21. PET scan on 8/13/21 showed multiple new hypermetabolic pulmonary nodules in both lungs over the interim consistent with progressive metastatic disease. He then completed palliative radiation to the brain in September 2021. Palliative gemcitabine was started on 9/28/2021.     Gemcitabine was held on 11/18/21 when worsening brain metastases were noted, with the intent to focus on brain radiation. However, after the patient met with radiation oncology, gemcitabine was restarted on 12/8 with the intent to repeat brain MRI after a few weeks and to possibly offer whole brain radiation.       From Dr. Charles's last note 11/18/2021:    CARIS-- without actionable mutations. tp53 mutation in exon 7 (likely pathogenic)and exon 10 (pathogenic variant)   tmb 9  CYNTHIA low  8%      Past Medical History:   Diagnosis Date    Depression        Family History   Problem Relation Age of Onset    Lung cancer Mother     Pancreatic cancer Father     Cancer Sister     Brain cancer Brother     Cancer Maternal Grandmother     Cancer Maternal Grandfather     Cancer Paternal Grandmother     Cancer Paternal Grandfather        Social History     Socioeconomic History    Marital status: Single   Tobacco Use    Smoking status: Former Smoker    Smokeless tobacco: Never Used   Substance and Sexual Activity    Alcohol use: Not Currently         MEDICATIONS:     Current Outpatient Medications on File Prior to Visit   Medication Sig Dispense Refill    ALPRAZolam (XANAX) 1 MG tablet Take 1 mg by mouth nightly as needed.      levETIRAcetam (KEPPRA) 500 MG Tab Take 1 tablet (500 mg total) by mouth 2 (two) times daily. 60 tablet 11    dexAMETHasone (DECADRON) 4 MG Tab Take 1 tablet (4 mg total) by mouth every 8 (eight) hours for 4 days, THEN 1 tablet (4 mg total) every 12 (twelve) hours for 4 days, THEN 1 tablet (4 mg total) once daily for 4 days. (Patient not taking: Reported on 12/27/2021) 24 tablet 0    famotidine (PEPCID) 20 MG tablet Take 20 mg by mouth 2 (two) times daily.       No current facility-administered medications on file prior to visit.       ALLERGIES: Review of patient's allergies indicates:  No Known Allergies     ROS:       Review of Systems   Constitutional: Negative for chills and fever.   HENT:   Negative for nosebleeds and sore throat.    Respiratory: Negative for cough and shortness of breath.    Cardiovascular: Negative for chest pain and palpitations.   Gastrointestinal: Negative for abdominal pain, diarrhea, nausea and vomiting.   Genitourinary: Negative for dysuria and hematuria.    Musculoskeletal: Negative for back pain and neck pain.   Skin: Negative for rash and wound.   Neurological: Positive for seizures. Negative for headaches and light-headedness.  "  Hematological: Negative for adenopathy. Does not bruise/bleed easily.   Psychiatric/Behavioral: Negative for depression. The patient is not nervous/anxious.        PHYSICAL EXAM:  Vitals:    12/27/21 1507   BP: (!) 149/88   Pulse: (!) 134   Resp: 18   Temp: 98.2 °F (36.8 °C)   TempSrc: Oral   SpO2: 96%   Weight: 56 kg (123 lb 7.3 oz)   Height: 5' 7" (1.702 m)   PainSc:   7       Physical Exam  Constitutional:       General: He is not in acute distress.     Appearance: He is well-developed and well-nourished. He is not diaphoretic.   HENT:      Head: Normocephalic and atraumatic.      Mouth/Throat:      Mouth: Oropharynx is clear and moist.   Eyes:      General: No scleral icterus.     Extraocular Movements: EOM normal.      Conjunctiva/sclera: Conjunctivae normal.   Cardiovascular:      Rate and Rhythm: Normal rate and regular rhythm.      Heart sounds: Normal heart sounds. No murmur heard.  No friction rub. No gallop.    Pulmonary:      Effort: Pulmonary effort is normal. No respiratory distress.      Breath sounds: Normal breath sounds. No wheezing or rales.   Abdominal:      General: Bowel sounds are normal. There is no distension.      Palpations: Abdomen is soft.      Tenderness: There is no abdominal tenderness. There is no guarding.   Musculoskeletal:         General: No edema. Normal range of motion.      Cervical back: Normal range of motion and neck supple.      Comments: RUE: 4/5 strength. Remainder of extremities 5/5 strength   Lymphadenopathy:      Cervical: No cervical adenopathy.   Skin:     General: Skin is warm and dry.      Findings: No rash.   Neurological:      General: No focal deficit present.      Mental Status: He is alert and oriented to person, place, and time.   Psychiatric:         Mood and Affect: Mood and affect and mood normal.         Behavior: Behavior normal.         LAB:   Results for orders placed or performed during the hospital encounter of 12/14/21   CBC W/ AUTO DIFFERENTIAL "   Result Value Ref Range    WBC 4.68 3.90 - 12.70 K/uL    RBC 4.26 (L) 4.60 - 6.20 M/uL    Hemoglobin 13.4 (L) 14.0 - 18.0 g/dL    Hematocrit 42.7 40.0 - 54.0 %     (H) 82 - 98 fL    MCH 31.5 (H) 27.0 - 31.0 pg    MCHC 31.4 (L) 32.0 - 36.0 g/dL    RDW 15.1 (H) 11.5 - 14.5 %    Platelets 234 150 - 450 K/uL    MPV 11.0 9.2 - 12.9 fL    Immature Granulocytes 1.5 (H) 0.0 - 0.5 %    Gran # (ANC) 2.9 1.8 - 7.7 K/uL    Immature Grans (Abs) 0.07 (H) 0.00 - 0.04 K/uL    Lymph # 1.5 1.0 - 4.8 K/uL    Mono # 0.2 (L) 0.3 - 1.0 K/uL    Eos # 0.0 0.0 - 0.5 K/uL    Baso # 0.02 0.00 - 0.20 K/uL    nRBC 0 0 /100 WBC    Gran % 61.6 38.0 - 73.0 %    Lymph % 31.6 18.0 - 48.0 %    Mono % 4.7 4.0 - 15.0 %    Eosinophil % 0.2 0.0 - 8.0 %    Basophil % 0.4 0.0 - 1.9 %    Differential Method Automated    TSH   Result Value Ref Range    TSH 3.153 0.400 - 4.000 uIU/mL   Protime-INR   Result Value Ref Range    Prothrombin Time 9.6 9.0 - 12.5 sec    INR 0.9 0.8 - 1.2   Troponin I   Result Value Ref Range    Troponin I 0.010 0.000 - 0.026 ng/mL   Comprehensive metabolic panel   Result Value Ref Range    Sodium 131 (L) 136 - 145 mmol/L    Potassium 5.1 3.5 - 5.1 mmol/L    Chloride 100 95 - 110 mmol/L    CO2 18 (L) 23 - 29 mmol/L    Glucose 126 (H) 70 - 110 mg/dL    BUN 13 6 - 20 mg/dL    Creatinine 0.7 0.5 - 1.4 mg/dL    Calcium 8.7 8.7 - 10.5 mg/dL    Total Protein 7.0 6.0 - 8.4 g/dL    Albumin 3.3 (L) 3.5 - 5.2 g/dL    Total Bilirubin 0.4 0.1 - 1.0 mg/dL    Alkaline Phosphatase 59 55 - 135 U/L    AST 36 10 - 40 U/L    ALT 39 10 - 44 U/L    Anion Gap 13 8 - 16 mmol/L    eGFR if African American >60.0 >60 mL/min/1.73 m^2    eGFR if non African American >60.0 >60 mL/min/1.73 m^2   Lipid panel   Result Value Ref Range    Cholesterol 211 (H) 120 - 199 mg/dL    Triglycerides 60 30 - 150 mg/dL    HDL 77 (H) 40 - 75 mg/dL    LDL Cholesterol 122.0 63.0 - 159.0 mg/dL    HDL/Cholesterol Ratio 36.5 20.0 - 50.0 %    Total Cholesterol/HDL Ratio 2.7  2.0 - 5.0    Non-HDL Cholesterol 134 mg/dL   Comprehensive metabolic panel   Result Value Ref Range    Sodium 133 (L) 136 - 145 mmol/L    Potassium 4.6 3.5 - 5.1 mmol/L    Chloride 100 95 - 110 mmol/L    CO2 20 (L) 23 - 29 mmol/L    Glucose 136 (H) 70 - 110 mg/dL    BUN 12 6 - 20 mg/dL    Creatinine 0.7 0.5 - 1.4 mg/dL    Calcium 9.9 8.7 - 10.5 mg/dL    Total Protein 7.8 6.0 - 8.4 g/dL    Albumin 3.6 3.5 - 5.2 g/dL    Total Bilirubin 0.7 0.1 - 1.0 mg/dL    Alkaline Phosphatase 70 55 - 135 U/L    AST 23 10 - 40 U/L    ALT 35 10 - 44 U/L    Anion Gap 13 8 - 16 mmol/L    eGFR if African American >60.0 >60 mL/min/1.73 m^2    eGFR if non African American >60.0 >60 mL/min/1.73 m^2   POCT COVID-19 Rapid Screening   Result Value Ref Range    POC Rapid COVID Negative Negative     Acceptable Yes    Echo   Result Value Ref Range    AV mean gradient 3 mmHg    Ao peak yosef 1.22 m/s    Ao VTI 18.64 cm    IVS 0.81 0.6 - 1.1 cm    LA size 2.20 cm    Left Atrium Major Axis 4.50 cm    Left Atrium Minor Axis 4.97 cm    LVIDd 3.46 (A) 3.5 - 6.0 cm    LVIDs 2.37 2.1 - 4.0 cm    LVOT diameter 1.99 cm    LVOT peak VTI 18.50 cm    Posterior Wall 0.80 0.6 - 1.1 cm    MV Peak A Yosef 0.69 m/s    E wave deceleration time 169.13 msec    MV Peak E Yosef 0.60 m/s    PV Peak D Yosef 0.57 m/s    PV Peak S Yosef 0.83 m/s    RA Major Axis 3.00 cm    RA Width 3.04 cm    RVDD 2.57 cm    Sinus 3.00 cm    TAPSE 1.68 cm    TR Max Yosef 1.13 m/s    TDI LATERAL 0.10 m/s    TDI SEPTAL 0.07 m/s    LA WIDTH 3.22 cm    MV stenosis pressure 1/2 time 49.05 ms    LV Diastolic Volume 49.33 mL    LV Systolic Volume 19.48 mL    RV S' 22.96 cm/s    LVOT peak yosef 1.18 m/s    LA volume (mod) 21.52 cm3    LV LATERAL E/E' RATIO 6.00 m/s    LV SEPTAL E/E' RATIO 8.57 m/s    FS 32 %    LA volume 28.44 cm3    LV mass 74.57 g    Left Ventricle Relative Wall Thickness 0.46 cm    AV valve area 3.09 cm2    AV Velocity Ratio 0.97     AV index (prosthetic) 0.99     MV valve  area p 1/2 method 4.49 cm2    E/A ratio 0.87     Mean e' 0.09 m/s    Pulm vein S/D ratio 1.46     LVOT area 3.1 cm2    LVOT stroke volume 57.51 cm3    AV peak gradient 6 mmHg    E/E' ratio 7.06 m/s    LV Systolic Volume Index 11.5 mL/m2    LV Diastolic Volume Index 29.02 mL/m2    LA Volume Index 16.7 mL/m2    LV Mass Index 44 g/m2    Triscuspid Valve Regurgitation Peak Gradient 5 mmHg    LA Volume Index (Mod) 12.7 mL/m2    BSA 1.68 m2    Right Atrial Pressure (from IVC) 3 mmHg    EF 55 %    TV rest pulmonary artery pressure 8 mmHg   ISTAT PROCEDURE   Result Value Ref Range    POC PH 7.274 (L) 7.35 - 7.45    POC PCO2 37.2 35 - 45 mmHg    POC PO2 58 40 - 60 mmHg    POC HCO3 17.2 (L) 24 - 28 mmol/L    POC BE -10 -2 to 2 mmol/L    POC SATURATED O2 86 (L) 95 - 100 %    POC Sodium 133 (L) 136 - 145 mmol/L    POC Potassium 5.4 (H) 3.5 - 5.1 mmol/L    POC TCO2 18 (L) 24 - 29 mmol/L    POC Ionized Calcium 1.19 1.06 - 1.42 mmol/L    POC Hematocrit 36 36 - 54 %PCV    Sample VENOUS     Site Other     Allens Test N/A    ISTAT PROCEDURE   Result Value Ref Range    POC Glucose 136 (H) 70 - 110 mg/dL    POC BUN 14 6 - 30 mg/dL    POC Creatinine 0.5 0.5 - 1.4 mg/dL    POC Sodium 135 (L) 136 - 145 mmol/L    POC Potassium 4.0 3.5 - 5.1 mmol/L    POC Chloride 99 95 - 110 mmol/L    POC TCO2 (MEASURED) 25 23 - 29 mmol/L    POC Ionized Calcium 1.20 1.06 - 1.42 mmol/L    POC Hematocrit 37 36 - 54 %PCV    Sample ESTHER    POCT glucose   Result Value Ref Range    POCT Glucose 141 (H) 70 - 110 mg/dL   POCT glucose   Result Value Ref Range    POCT Glucose 168 (H) 70 - 110 mg/dL     MRI Brain 11/15/21:   EXAM: [facility] [place]RAIN PERFUSION W WO CONTRAST     CLINICAL INDICATION:  Brain/CNS neoplasm, surveillance. Metastatic lung cancer. Follow-up.     TECHNIQUE: Pre-contrast and postcontrast acquisitions of the brain were obtained. Dynamic susceptibility contrast (DSC) perfusion-w images were obtained. Intravenous contrast was administered for the  performance of this exam.     COMPARISON: MRI brain 8/24/2021, 8/6/2021, 8/4/2021, 3/5/2021     FINDINGS:   TUMOR:   Location:   *   Interval expected evolution of immediate postsurgical changes in the right cerebellum at site of prior mass resection with interval progression of rim-enhancement at the surgical site measuring in total 3.8 x 2.1 cm (image 9, series 42). Residual hyperintense T2 blood products within the posterior aspect of the resection cavity. Findings represent a combination of postsurgical change, blood products, and residual tumor. The resection cavity alone measured 2.6 x 2.2 cm on 8/24/2021 with suspected residual tumor on current exam along the lateral aspect of the resection cavity (image 9, series 42) measuring 1.5 x 1.6 cm (image 70, series 46). Overall interval improved mass effect within the posterior fossa with no herniation or midline shift.     *   Interval progression of rim-enhancing lesion inseparable from the left occipital horn measuring 1.9 x 1 cm (image 11, series 42), previously measured 1.1 x 0.5 cm on 8/24/2021. Associated worsening hyperintense T2/FLAIR signal within the left periatrial white matter.   *   Interval progression of rim-enhancing lesion within the left middle frontal gyrus measuring 2.4 x 2.5 cm (image 21, series 42), previously measured 1.2 x 1.2 cm on 8/24/2021. Associated worsening hyperintense T2/FLAIR signal throughout the left superior and middle frontal gyri.   *   Interval progression of rim-enhancing lesion within the left precentral gyrus measuring 1.8 x 1.5 cm (image 21, series 42), previously measured 0.7 x 0.6 cm on 8/24/2021. Associated worsening hyperintense T2/FLAIR signal within the posterior left frontal lobe.   *   Interval progression of rim-enhancing lesion in the right precentral gyrus measuring 1.6 x 1.4 cm (image 21, series 42), previously measured 0.4 x 0.3 cm on 8/24/2021. Associated worsening hyperintense T2/FLAIR signal in the right  precentral gyrus.   *   New extra-axial enhancing lesion along the undersurface of the left tentorial leaflet with wide dural interface measures 1.2 x 0.7 x 1.9 cm (image 15, series 33; image 9, series 42).   *   New extra-axial lesion along the lateral right cerebellar convexity with wide dural interface measures 0.9 x 0.6 cm (image 62, series 46).     FLAIR: Progression of multifocal nonenhancing FLAIR signal abnormality within the left frontal lobe, right frontal lobe, and periatrial white matter consistent with worsened vasogenic edema surrounding brain metastases. Mild mass effect in the left frontal lobe without mass effect or herniation.     Diffusion: No diffusion abnormality to suggest hypercellular tumor.     Perfusion: Abnormal hyperperfusion is not well characterized given thin rim enhancement.     Other: Interval development of susceptibility artifact within enlarging, rim-enhancing lesions in the frontal lobes consistent with intralesional hemorrhage. No herniation.       ADDITIONAL FINDINGS:   Parenchyma: No infarction on DWI. FLAIR signal changes and mass effect in the left frontal lobe as detailed above.   Extra-axial Collection:  Status post suboccipital craniectomy with extracranial fluid collection in the suboccipital soft tissues measuring 5.9 x 5.5 x 2 cm (image 11, series 5; image 4, series 6) consistent with pseudomeningocele formation.   Ventricular System: No hydrocephalus.       PROBLEMS ASSESSED THIS VISIT:    1. Primary adenocarcinoma of right lung    2. Secondary malignant neoplasm of brain    3. Seizure        ASSESSMENT AND PLAN    Metastatic Lung Adenocarcinoma  Lung adenocarcinoma with no targetable mutations. 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   - discussed poor prognosis with patient. He has an appointment with palliative care scheduled for 1/3. He remains  very motivated to pursue treatment  - refer to radiation oncology to discuss whole brain radiation  - submitted docetaxel for insurance authorization  - will send Guardant 360  - will follow up in clinic when docetaxel has been approved with CBC, CMP     Seizure due to brain metastases  - continue keppra  - completing course of dexamethasone taper and symptoms much improved    Discussed with Dr. Betsy Yu,   PGY-IV  Hematology/Oncology Fellow

## 2021-12-27 NOTE — PLAN OF CARE
START OFF PATHWAY REGIMEN - Non-Small Cell Lung            Docetaxel (Taxotere)           Additional Orders: Premedicate with dexamethasone 8 mg PO BID for three   days beginning 1 day prior to therapy    **Always confirm dose/schedule in your pharmacy ordering system**    Patient Characteristics:  Stage IV Metastatic, Nonsquamous, Inappropriate Line Of Therapy - Not   Appropriate  Therapeutic Status: Stage IV Metastatic  Histology: Nonsquamous Cell  ROS1 Rearrangement Status: Negative  Other Mutations/Biomarkers: No Other Actionable Mutations  Chemotherapy/Immunotherapy LOT: Not Appropriate  Molecular Targeted Therapy: Not Appropriate  KRAS G12C Mutation Status: Negative  MET Exon 14 Mutation Status: Negative  RET Gene Fusion Status: Negative  EGFR Mutation Status: Negative/Wild Type  NTRK Gene Fusion Status: Negative  PD-L1 Expression Status: PD-L1 Negative  ALK Rearrangement Status: Negative  BRAF [id number] Mutation Status: Negative  Intent of Therapy:  Non-Curative / Palliative Intent, Discussed with Patient

## 2021-12-27 NOTE — Clinical Note
Needs auth for docetaxel Refer to radiation oncology Follow up in clinic with CBC and CMP when docetaxel approved with chemo chair Thank you!

## 2021-12-30 ENCOUNTER — HOSPITAL ENCOUNTER (OUTPATIENT)
Dept: RADIATION THERAPY | Facility: HOSPITAL | Age: 61
Discharge: HOME | End: 2021-12-30
Attending: RADIOLOGY
Payer: MEDICARE

## 2021-12-30 ENCOUNTER — OFFICE VISIT (OUTPATIENT)
Dept: RADIATION ONCOLOGY | Facility: CLINIC | Age: 61
End: 2021-12-30
Payer: MEDICARE

## 2021-12-30 VITALS
OXYGEN SATURATION: 99 % | WEIGHT: 123.63 LBS | HEART RATE: 109 BPM | DIASTOLIC BLOOD PRESSURE: 94 MMHG | SYSTOLIC BLOOD PRESSURE: 154 MMHG | BODY MASS INDEX: 19.36 KG/M2 | RESPIRATION RATE: 18 BRPM

## 2021-12-30 DIAGNOSIS — C34.91 PRIMARY ADENOCARCINOMA OF RIGHT LUNG: ICD-10-CM

## 2021-12-30 DIAGNOSIS — C79.31 SECONDARY MALIGNANT NEOPLASM OF BRAIN: Primary | ICD-10-CM

## 2021-12-30 PROCEDURE — 99213 OFFICE O/P EST LOW 20 MIN: CPT | Mod: 25,S$GLB,, | Performed by: RADIOLOGY

## 2021-12-30 PROCEDURE — 77263 THER RADIOLOGY TX PLNG CPLX: CPT | Mod: ,,, | Performed by: RADIOLOGY

## 2021-12-30 PROCEDURE — 77334 PR  RADN TREATMENT AID(S) COMPLX: ICD-10-PCS | Mod: 26,,, | Performed by: RADIOLOGY

## 2021-12-30 PROCEDURE — 99999 PR PBB SHADOW E&M-EST. PATIENT-LVL III: ICD-10-PCS | Mod: PBBFAC,,, | Performed by: RADIOLOGY

## 2021-12-30 PROCEDURE — 77014 HC CT GUIDANCE RADIATION THERAPY FLDS PLACEMENT: CPT | Mod: TC | Performed by: RADIOLOGY

## 2021-12-30 PROCEDURE — 3077F SYST BP >= 140 MM HG: CPT | Mod: CPTII,S$GLB,, | Performed by: RADIOLOGY

## 2021-12-30 PROCEDURE — 1159F MED LIST DOCD IN RCRD: CPT | Mod: CPTII,S$GLB,, | Performed by: RADIOLOGY

## 2021-12-30 PROCEDURE — 77290 PR  SET RADN THERAPY FIELD COMPLEX: ICD-10-PCS | Mod: 26,,, | Performed by: RADIOLOGY

## 2021-12-30 PROCEDURE — 99213 OFFICE O/P EST LOW 20 MIN: CPT | Mod: PBBFAC,25 | Performed by: RADIOLOGY

## 2021-12-30 PROCEDURE — 3008F BODY MASS INDEX DOCD: CPT | Mod: CPTII,S$GLB,, | Performed by: RADIOLOGY

## 2021-12-30 PROCEDURE — 3080F PR MOST RECENT DIASTOLIC BLOOD PRESSURE >= 90 MM HG: ICD-10-PCS | Mod: CPTII,S$GLB,, | Performed by: RADIOLOGY

## 2021-12-30 PROCEDURE — 77334 RADIATION TREATMENT AID(S): CPT | Mod: TC | Performed by: RADIOLOGY

## 2021-12-30 PROCEDURE — 3080F DIAST BP >= 90 MM HG: CPT | Mod: CPTII,S$GLB,, | Performed by: RADIOLOGY

## 2021-12-30 PROCEDURE — 1111F PR DISCHARGE MEDS RECONCILED W/ CURRENT OUTPATIENT MED LIST: ICD-10-PCS | Mod: CPTII,S$GLB,, | Performed by: RADIOLOGY

## 2021-12-30 PROCEDURE — 77263 PR  RADIATION THERAPY PLAN COMPLEX: ICD-10-PCS | Mod: ,,, | Performed by: RADIOLOGY

## 2021-12-30 PROCEDURE — 3008F PR BODY MASS INDEX (BMI) DOCUMENTED: ICD-10-PCS | Mod: CPTII,S$GLB,, | Performed by: RADIOLOGY

## 2021-12-30 PROCEDURE — 1111F DSCHRG MED/CURRENT MED MERGE: CPT | Mod: CPTII,S$GLB,, | Performed by: RADIOLOGY

## 2021-12-30 PROCEDURE — 99213 PR OFFICE/OUTPT VISIT, EST, LEVL III, 20-29 MIN: ICD-10-PCS | Mod: 25,S$GLB,, | Performed by: RADIOLOGY

## 2021-12-30 PROCEDURE — 77290 THER RAD SIMULAJ FIELD CPLX: CPT | Mod: TC | Performed by: RADIOLOGY

## 2021-12-30 PROCEDURE — 77334 RADIATION TREATMENT AID(S): CPT | Mod: 26,,, | Performed by: RADIOLOGY

## 2021-12-30 PROCEDURE — 1159F PR MEDICATION LIST DOCUMENTED IN MEDICAL RECORD: ICD-10-PCS | Mod: CPTII,S$GLB,, | Performed by: RADIOLOGY

## 2021-12-30 PROCEDURE — 77290 THER RAD SIMULAJ FIELD CPLX: CPT | Mod: 26,,, | Performed by: RADIOLOGY

## 2021-12-30 PROCEDURE — 99999 PR PBB SHADOW E&M-EST. PATIENT-LVL III: CPT | Mod: PBBFAC,,, | Performed by: RADIOLOGY

## 2021-12-30 PROCEDURE — 3077F PR MOST RECENT SYSTOLIC BLOOD PRESSURE >= 140 MM HG: ICD-10-PCS | Mod: CPTII,S$GLB,, | Performed by: RADIOLOGY

## 2022-01-03 ENCOUNTER — OFFICE VISIT (OUTPATIENT)
Dept: PALLIATIVE MEDICINE | Facility: CLINIC | Age: 62
End: 2022-01-03
Payer: MEDICARE

## 2022-01-03 ENCOUNTER — HOSPITAL ENCOUNTER (OUTPATIENT)
Dept: RADIATION THERAPY | Facility: HOSPITAL | Age: 62
Discharge: HOME | End: 2022-01-03
Attending: RADIOLOGY
Payer: MEDICARE

## 2022-01-03 VITALS — DIASTOLIC BLOOD PRESSURE: 70 MMHG | HEART RATE: 110 BPM | SYSTOLIC BLOOD PRESSURE: 114 MMHG

## 2022-01-03 DIAGNOSIS — R51.9 INTRACTABLE HEADACHE, UNSPECIFIED CHRONICITY PATTERN, UNSPECIFIED HEADACHE TYPE: ICD-10-CM

## 2022-01-03 DIAGNOSIS — Z71.89 ACP (ADVANCE CARE PLANNING): ICD-10-CM

## 2022-01-03 DIAGNOSIS — C34.90 PRIMARY ADENOCARCINOMA OF LUNG, UNSPECIFIED LATERALITY: ICD-10-CM

## 2022-01-03 DIAGNOSIS — Z51.5 PALLIATIVE CARE ENCOUNTER: ICD-10-CM

## 2022-01-03 DIAGNOSIS — C79.31 SECONDARY MALIGNANT NEOPLASM OF BRAIN: ICD-10-CM

## 2022-01-03 DIAGNOSIS — C34.91 PRIMARY ADENOCARCINOMA OF RIGHT LUNG: ICD-10-CM

## 2022-01-03 DIAGNOSIS — C79.31 NON-SMALL CELL LUNG CANCER METASTATIC TO BRAIN: Primary | ICD-10-CM

## 2022-01-03 DIAGNOSIS — C34.90 NON-SMALL CELL LUNG CANCER METASTATIC TO BRAIN: Primary | ICD-10-CM

## 2022-01-03 PROCEDURE — 77295 3-D RADIOTHERAPY PLAN: CPT | Mod: 26,,, | Performed by: RADIOLOGY

## 2022-01-03 PROCEDURE — 3078F DIAST BP <80 MM HG: CPT | Mod: CPTII,S$GLB,, | Performed by: EMERGENCY MEDICINE

## 2022-01-03 PROCEDURE — 99215 OFFICE O/P EST HI 40 MIN: CPT | Mod: S$GLB,,, | Performed by: EMERGENCY MEDICINE

## 2022-01-03 PROCEDURE — 1111F PR DISCHARGE MEDS RECONCILED W/ CURRENT OUTPATIENT MED LIST: ICD-10-PCS | Mod: CPTII,S$GLB,, | Performed by: EMERGENCY MEDICINE

## 2022-01-03 PROCEDURE — 77295 PR 3D RADIOTHERAPY PLAN: ICD-10-PCS | Mod: 26,,, | Performed by: RADIOLOGY

## 2022-01-03 PROCEDURE — 99999 PR PBB SHADOW E&M-EST. PATIENT-LVL II: CPT | Mod: PBBFAC,,, | Performed by: EMERGENCY MEDICINE

## 2022-01-03 PROCEDURE — 77334 RADIATION TREATMENT AID(S): CPT | Mod: TC | Performed by: RADIOLOGY

## 2022-01-03 PROCEDURE — 99999 PR PBB SHADOW E&M-EST. PATIENT-LVL II: ICD-10-PCS | Mod: PBBFAC,,, | Performed by: EMERGENCY MEDICINE

## 2022-01-03 PROCEDURE — 3074F PR MOST RECENT SYSTOLIC BLOOD PRESSURE < 130 MM HG: ICD-10-PCS | Mod: CPTII,S$GLB,, | Performed by: EMERGENCY MEDICINE

## 2022-01-03 PROCEDURE — 3074F SYST BP LT 130 MM HG: CPT | Mod: CPTII,S$GLB,, | Performed by: EMERGENCY MEDICINE

## 2022-01-03 PROCEDURE — 77300 RADIATION THERAPY DOSE PLAN: CPT | Mod: 26,,, | Performed by: RADIOLOGY

## 2022-01-03 PROCEDURE — 77334 PR  RADN TREATMENT AID(S) COMPLX: ICD-10-PCS | Mod: 26,,, | Performed by: RADIOLOGY

## 2022-01-03 PROCEDURE — 1111F DSCHRG MED/CURRENT MED MERGE: CPT | Mod: CPTII,S$GLB,, | Performed by: EMERGENCY MEDICINE

## 2022-01-03 PROCEDURE — 3078F PR MOST RECENT DIASTOLIC BLOOD PRESSURE < 80 MM HG: ICD-10-PCS | Mod: CPTII,S$GLB,, | Performed by: EMERGENCY MEDICINE

## 2022-01-03 PROCEDURE — 77295 3-D RADIOTHERAPY PLAN: CPT | Mod: TC | Performed by: RADIOLOGY

## 2022-01-03 PROCEDURE — 99215 PR OFFICE/OUTPT VISIT, EST, LEVL V, 40-54 MIN: ICD-10-PCS | Mod: S$GLB,,, | Performed by: EMERGENCY MEDICINE

## 2022-01-03 PROCEDURE — 77334 RADIATION TREATMENT AID(S): CPT | Mod: 26,,, | Performed by: RADIOLOGY

## 2022-01-03 PROCEDURE — 77300 RADIATION THERAPY DOSE PLAN: CPT | Mod: TC | Performed by: RADIOLOGY

## 2022-01-03 PROCEDURE — 77300 PR RADIATION THERAPY,DOSIMETRY PLAN: ICD-10-PCS | Mod: 26,,, | Performed by: RADIOLOGY

## 2022-01-03 RX ORDER — ALPRAZOLAM 1 MG/1
1 TABLET ORAL 3 TIMES DAILY PRN
Qty: 90 TABLET | Refills: 0 | Status: SHIPPED | OUTPATIENT
Start: 2022-01-03 | End: 2022-02-02 | Stop reason: SDUPTHER

## 2022-01-03 RX ORDER — OXYCODONE HYDROCHLORIDE 5 MG/1
5 TABLET ORAL EVERY 4 HOURS PRN
Qty: 120 TABLET | Refills: 0 | Status: SHIPPED | OUTPATIENT
Start: 2022-01-03 | End: 2022-02-03 | Stop reason: SDUPTHER

## 2022-01-03 RX ORDER — BUTALBITAL, ACETAMINOPHEN AND CAFFEINE 50; 325; 40 MG/1; MG/1; MG/1
1 TABLET ORAL EVERY 4 HOURS PRN
Qty: 60 TABLET | Refills: 0 | Status: SHIPPED | OUTPATIENT
Start: 2022-01-03 | End: 2022-02-02 | Stop reason: SDUPTHER

## 2022-01-03 RX ORDER — DEXAMETHASONE 4 MG/1
4 TABLET ORAL DAILY
Qty: 30 TABLET | Refills: 0 | Status: SHIPPED | OUTPATIENT
Start: 2022-01-03 | End: 2022-02-02 | Stop reason: SDUPTHER

## 2022-01-03 NOTE — PROGRESS NOTES
[facility]  Palliative Care   Psychosocial Assessment    Patient Name: Kvng Edwards  MRN: [id number]  Palliative Care Provider: Tad Westbrook MD   Primary Care Physician: Valdez Georges MD  Principal Problem: Lung adenocarcinoma     Reason for Referral: Advanced Care Planning and Psychosocial Support       Present during Interview: patient and spouse/SO.      Primary Language:English   Needed: no      Past Medical Situation:   PMH:   Past Medical History:   Diagnosis Date    Depression      Mental Health/Substance Use History: Patient endorses long-standing history of anxiety   Risk of Abuse, neglect or exploitation: None identified   Current or Previous Trauma and/or evidence of PTSD: Patient reports suffering from PTSD from witnessing two traumatic deaths   Non-traditional Health practices: None identified     Understanding of diagnosis and prognosis: Limited   Experience/Comfort level with health care system: Limited     Patients Mental Status: Alert and oriented to person, place, time and situation with stable mood     Socio-Economic Factors/Resources:  Address: [address] [place] [address]  Phone Number: [phone] (home)     Marital Status: Engaged   Household composition: Patient, fiance/Lolly, and fourteen-year-old granddaughter   Children:  Two sons ( per previous palliative consult note)    Patient/Family perceptions about Caregiving Needs; availability and capacity: Patient independent at this time.  Patient's fiance is also undergoing treatment for pancreatic cancer.  The couple report they have good support from patient's mother and neighbors.  It is unclear the extent of care these parties can provide upon patient's decline.  Patient's fiance's own health concerns may complicate these efforts as well.     Family Dynamics/Relationships: Healthy     Patient/Family Strengths/Resilience: Patient and fiance exhibit a great deal of care and  support for each other despite dealing with their individual cancer diagnoses.   Patient/Family Coping: Appropriately     Activities of Daily Living: Independent   Support Systems-Family & Community (Home Health, HME etc): No known community supports utilized at this time     Transportation:  yes    Work/Education History: retired   Self-Care Activities/Hobbies: Not discussed      History: no    Financial Resources:Medicare      Advanced Care Planning & Legal Concerns:   Advanced Directives/Living Will: no  LaPOST/POLST: no   Planning:  no    Power of : no  Surrogate Decision Maker: Brother/Raffi Edwards       Spirituality, Culture & Coping Mechanisms:  F- Kinza and Belief: Unknown     I - Importance: Not discussed     C - Community/Culture Values: Not discussed      A - Address in Care: TBD       Goals/Hopes/Expectations: Patient hopes to fight his disease.   Fears/Anxiety/Concerns: Patient expresses feelings of frustration with his former oncology providers.       Complicated Bereavement Risk Assessment Tool (CBRAT)  Reference:  [facility] Clinical Practice Group (May 2016). Bereavement Risk Screening and Management Guidelines.  Retrieved from: http://www.grpcc.com.au/wp-content/uploads//KRQMI-Biylkutfqvh-Wkvrhzhif-and-Management-Guideline-2016.pdf      Bereaved Client Characteristics   ? Under 18      yes  ? Was a Twin   no  ? Young Spouse   no  ? Elderly Spouse    no  ? Isolated    no  ? Lacks Meaningful Social Support   no  ? Dissatisfied with help available during illness   no  ? New to Financial [place] no  ? New to Decision-Making   no    Illness  ? Inherited Disorder   no  ? Stigmatized Disease in the family/community   no  ? Lengthy/Burdensome   no     Bereaved Client's History of Loss   ? Cumulative Multiple Losses   yes  ? Previous Mental Health Illnesses   no  ? Current Mental Health Illness   no  ? Other Significant Health  Issues   yes   ? Migrant/Refugee   no Death  ? Sudden or Unexpected   no  ? Traumatic Circumstances Associated with Death   no  ? Significant Cultural/Social Burdens as a result of Death   yes   Relationship with   ? Profound Lifelong Partner   yes  ? Highly Dependent    no  ? Antagonistic   no  ? Ambivalent   no  ? Deeply Connected   yes  ? Culturally Defined   yes   Risk Factors Scores  0-2  Low  3-5  Moderate  5+  High  All persons scoring moderate to high presume to be at risk**    (** It is acknowledged that protective factors and resilience may outweigh apparent risk factors.      Total Risk Factors Score:   Moderate to high Patient and rupa are both undergoing treatment for cancer.  The couple cares for the patient's fourteen-year-old granddaughter whose father (patient's son) is .  Patient and family would benefit from additional support due to risk of complicated grief.        SW accompanied MD during inital visit with patient and rupa/Lolly.  Patient presents AAOx4 with stable mood. Patient appears to have a limited understanding of his illness.  Patient reflected upon his history with his former oncology team. Patient reports his team did not communicate clearly with him and other providers within the hospital system.  Patient adds he was informed there were no further options for treatment within this system and was encouraged to seek treatment out of state.  Patient reports he is hopeful his team at [facility] can cure his disease.  In addition to aforementioned frustrations, patient endorses pain and long-standing anxiety stemming from past traumatic events.  Patient reports use of Xanax prescribed through his psychiatrist.  Patient resides with his rupa/Lolly and his fourteen year old granddaughter.  Patient's rupa is currently undergoing treatment for pancreatic cancer and is seen in this clinic.  The couple raise the patient's fourteen-year-old granddaughter who is the daughter  of patient's  son. (According to [facility] palliative consult, both of patient's sons are .). This writer is familiar with the dynamic between patient's fiance and patient's granddaughter as fiance frequently identifies her granddaughter's behavior as a major stressor. (Fiance was not accompanied by patient during her own visit with Palliative Medicine.) Patient and fiance did not identify patient's granddaughter as a stressor at this time.  Discussion of ACP initiated by MD.  Patient reports he would like all life sustaining measures to be attempted until it appears patient has no chance of recovery.  Patient notes he assigned his brother/Dace as HCPOA.  Directive not found in Epic.  Patient encouraged to provide this documentation to clinic at future visit.  Pain contract reviewed with patient and witnessed by MD and this writer.  Patient and fiance deny further psychosocial concerns. SW remains available to provide emotional support and psychosocial assistance. SW will continue to follow.    Sarah Garner, NANCY  Outpatient   Palliative Medicine

## 2022-01-03 NOTE — PROGRESS NOTES
Consult Note  Palliative Care      Consult Requested By: Dr. 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  -     butalbital-acetaminophen-caffeine -40 mg (FIORICET, ESGIC) -40 mg per tablet; Take 1 tablet by mouth every 4 (four) hours as needed for Pain or Headaches.  -     oxyCODONE (ROXICODONE) 5 MG immediate release tablet; Take 1 tablet (5 mg total) by mouth every 4 (four) hours as needed for Pain.  -     ALPRAZolam (XANAX) 1 MG tablet; Take 1 tablet (1 mg total) by mouth 3 (three) times daily as needed for Anxiety.  - Planning for whole brain radiation followed by exploring systemic chemotherapy options  - Continue home PT    Understanding of illness: excellent; pt with excellent prognostic awareness; he knows he may only have a few months at the most with progression of disease      ACP (advance care planning)  Palliative care encounter    Prognosis: poor; likely weeks to months-discussed with the pt and significant other    Advance Care Planning   Goals of care: pt hoping to achieve the best QOL of possible with current tx plan of WBXRT and chemo if possible.  Hopes top meaningfully extend life, however, he is clear that if continued or proposed therapy is unlikely to provide meaningful improvements to his QOL, he would want to focus on a comfort focused care plan, maximizing his time in home setting.    He voiced that his brother Raffi Edwards is also aware of his intentions and hopes.  Desires his brother to act as HCPOA should he lose the capacity  to make medical decisions       Follow up: 1-2 months    SUBJECTIVE:     History of Present Illness:  Patient is a 61 y.o. 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  He experienced a seizure and loss of consciousness while visiting his significant other in the hospital who is being treated for pancreatic cancer herself.  History significant for lung cancer diagnosed in February 2021 and treated at [facility]. I do not have records available, but he believes he underwent resection of a brain metastasis several months ago followed by radiation to the head. MRI Brain 12/14/21 demonstrated multiple cystic rim-enhancing lesions c/w metastases as well as multifocal areas of leptomeningeal disease. He was seen by Dr. Estiven Dawkins in XRT and planning on undergonig whole brain radiation after discussing risks.  Plan is for WBRT 20 Gy in 5 fractions and in consideration of systemic therapy with Medical Oncology.  He presents today with chief complaint being persistent and intensifying headaches.  No additional seizures since being home, no falls.  Poor appetite, poor sleep do to pain, and increased worry/anxiety over what the future holds.  No fevers.  Mild weakness on right side has been stable.  Significant other, Yumiko Stanley also with the pt today.    LA  reviewed and summarized 1/3:  12/29/2021 Oxycodone-Acetaminophn 7.5-325 60.00 30 Lizzy Abdirahman   12/17/2021 Alprazolam 1 Mg Tablet 60.00 60 Sco Aco   12/10/2021 Hydrocodone-Acetamin  Mg 60.00 15 Bernie Ell         Previous experience or exposure to a serious illness:  yes    Past Medical History:   Diagnosis Date    Depression      Past Surgical History:   Procedure Laterality Date    TONSILLECTOMY      TUMOR REMOVAL       Family History   Problem Relation Age of Onset    Lung cancer Mother     Pancreatic cancer Father     Cancer Sister     Brain cancer Brother     Cancer Maternal Grandmother     Cancer  Maternal Grandfather     Cancer Paternal Grandmother     Cancer Paternal Grandfather      Review of patient's allergies indicates:  No Known Allergies    Medications:    Current Outpatient Medications:     ALPRAZolam (XANAX) 1 MG tablet, Take 1 mg by mouth nightly as needed., Disp: , Rfl:     famotidine (PEPCID) 20 MG tablet, Take 20 mg by mouth 2 (two) times daily., Disp: , Rfl:     levETIRAcetam (KEPPRA) 500 MG Tab, Take 1 tablet (500 mg total) by mouth 2 (two) times daily., Disp: 60 tablet, Rfl: 11    OBJECTIVE:       ROS:  Review of Systems   All other systems reviewed and are negative.    Constitutional: Negative for fever and weight loss.   HENT: Negative for ear pain and sore throat.    Eyes: Negative for blurred vision and double vision.   Respiratory: Negative for cough, hemoptysis and shortness of breath.    Cardiovascular: Negative for chest pain and leg swelling.   Gastrointestinal: Negative for abdominal pain, constipation, diarrhea, heartburn and nausea.   Genitourinary: Negative for dysuria and hematuria.   Musculoskeletal: Positive for back pain. Negative for falls and joint pain.   Neurological: Positive for tingling, focal weakness and headaches. Negative for speech change and seizures.   Psychiatric/Behavioral: Negative for depression. The patient is nervous/anxious.    Review of Symptoms    Symptom Assessment (ESAS 0-10 Scale)  Pain:  10  Dyspnea:  8  Anxiety:  10  Nausea:  0  Depression:  0  Anorexia:  0  Fatigue:  8  Insomnia:  10  Restlessness:  0  Agitation:  0         Pain Assessment:  Location(s): head    Head       Location: frontal        Quality: aching        Quantity: 10/10 in intensity        Chronicity: Onset 1 month(s) ago, gradually worsening        Aggravating Factors: activity        Alleviating Factors: NSAIDs and acetaminophen        Associated Symptoms: none (no additional seizures)    ECOG Performance Status stGstrstastdstest:st st1st Living Arrangement: lives with significant other  and nata     Psychosocial/Cultural: Sig other Lolly Stanley also has advanced cancer (pancreatic)    Would want his brother to be main decision maker (primary HCPOA-oral declaration)- witnessed by NANCY Garner    Spiritual:  F - Kinza and Belief:  Did not discuss today           Advance Care Planning   Advance Directives:   Living Will: No    LaPOST: No    Do Not Resuscitate Status: No    Medical Power of : Yes        Oral Declaration: Yes   Witnesses:  Sarah Garner LCSW and Olga Albert RN   Agent's Name:  Raffi Edwards (brother) [phone]    Decision Making:  Patient answered questions              Physical Exam:  Vitals: Pulse: 110 (01/03/22 1301)  BP: 114/70 (01/03/22 1301)  Physical Exam  Vitals and nursing note reviewed.     Constitutional:       Appearance: Normal appearance.   HENT:      Head: Normocephalic and atraumatic.   Cardiology:  Regular rate; no murmur  Eyes:      General: No scleral icterus.     Extraocular Movements: Extraocular movements intact.   Pulmonary:      Effort: Pulmonary effort is normal. No respiratory distress.   Abdominal:      General: There is no distension.   Musculoskeletal:      Cervical back: Neck supple. No deformities  Lymphadenopathy:      Cervical: No cervical adenopathy.   Skin:     General: Skin is warm and dry.   Neurological:      General: mild right sided weakness     Mental Status: He is alert and oriented to person, place, and time.      Cranial Nerves: No cranial nerve deficit.   Psychiatric:         Mood and Affect: Mood normal.         Behavior: Behavior normal.         Judgment: Judgment normal.     Labs:  CBC:   WBC   Date Value Ref Range Status   12/14/2021 4.68 3.90 - 12.70 K/uL Final     Hemoglobin   Date Value Ref Range Status   12/14/2021 13.4 (L) 14.0 - 18.0 g/dL Final     POC Hematocrit   Date Value Ref Range Status   12/14/2021 37 36 - 54 %PCV Final     Hematocrit   Date Value Ref Range Status   12/14/2021 42.7 40.0 - 54.0 % Final      MCV   Date Value Ref Range Status   12/14/2021 100 (H) 82 - 98 fL Final     Platelets   Date Value Ref Range Status   12/14/2021 234 150 - 450 K/uL Final       LFT:   Lab Results   Component Value Date    AST 23 12/14/2021    ALKPHOS 70 12/14/2021    BILITOT 0.7 12/14/2021       Albumin:   Albumin   Date Value Ref Range Status   12/14/2021 3.6 3.5 - 5.2 g/dL Final     Protein:   Total Protein   Date Value Ref Range Status   12/14/2021 7.8 6.0 - 8.4 g/dL Final       Radiology:I have reviewed all pertinent imaging results/findings within the past 24 hours.  CT chest was concerning for progressive disease in the bilateral lungs. He underwent surgical resection of the 5.2cm mass on 8/5/21. PET scan on 8/13/21 showed multiple new hypermetabolic pulmonary nodules in both lungs over the interim consistent with progressive metastatic disease    MRI Brain 11/15/21:   [place] [facility] [place]     CLINICAL INDICATION:  Brain/CNS neoplasm, surveillance. Metastatic lung cancer. Follow-up.     TECHNIQUE: Pre-contrast and postcontrast acquisitions of the brain were obtained. Dynamic susceptibility contrast (DSC) perfusion-w images were obtained. Intravenous contrast was administered for the performance of this exam.     COMPARISON: MRI brain 8/24/2021, 8/6/2021, 8/4/2021, 3/5/2021     FINDINGS:   TUMOR:   Location:   *   Interval expected evolution of immediate postsurgical changes in the right cerebellum at site of prior mass resection with interval progression of rim-enhancement at the surgical site measuring in total 3.8 x 2.1 cm (image 9, series 42). Residual hyperintense T2 blood products within the posterior aspect of the resection cavity. Findings represent a combination of postsurgical change, blood products, and residual tumor. The resection cavity alone measured 2.6 x 2.2 cm on 8/24/2021 with suspected residual tumor on current exam along the lateral aspect of the resection cavity (image 9, series  42) measuring 1.5 x 1.6 cm (image 70, series 46). Overall interval improved mass effect within the posterior fossa with no herniation or midline shift.     *   Interval progression of rim-enhancing lesion inseparable from the left occipital horn measuring 1.9 x 1 cm (image 11, series 42), previously measured 1.1 x 0.5 cm on 8/24/2021. Associated worsening hyperintense T2/FLAIR signal within the left periatrial white matter.   *   Interval progression of rim-enhancing lesion within the left middle frontal gyrus measuring 2.4 x 2.5 cm (image 21, series 42), previously measured 1.2 x 1.2 cm on 8/24/2021. Associated worsening hyperintense T2/FLAIR signal throughout the left superior and middle frontal gyri.   *   Interval progression of rim-enhancing lesion within the left precentral gyrus measuring 1.8 x 1.5 cm (image 21, series 42), previously measured 0.7 x 0.6 cm on 8/24/2021. Associated worsening hyperintense T2/FLAIR signal within the posterior left frontal lobe.   *   Interval progression of rim-enhancing lesion in the right precentral gyrus measuring 1.6 x 1.4 cm (image 21, series 42), previously measured 0.4 x 0.3 cm on 8/24/2021. Associated worsening hyperintense T2/FLAIR signal in the right precentral gyrus.   *   New extra-axial enhancing lesion along the undersurface of the left tentorial leaflet with wide dural interface measures 1.2 x 0.7 x 1.9 cm (image 15, series 33; image 9, series 42).   *   New extra-axial lesion along the lateral right cerebellar convexity with wide dural interface measures 0.9 x 0.6 cm (image 62, series 46).     FLAIR: Progression of multifocal nonenhancing FLAIR signal abnormality within the left frontal lobe, right frontal lobe, and periatrial white matter consistent with worsened vasogenic edema surrounding brain metastases. Mild mass effect in the left frontal lobe without mass effect or herniation.     Diffusion: No diffusion abnormality to suggest hypercellular tumor.      Perfusion: Abnormal hyperperfusion is not well characterized given thin rim enhancement.     Other: Interval development of susceptibility artifact within enlarging, rim-enhancing lesions in the frontal lobes consistent with intralesional hemorrhage. No herniation.       ADDITIONAL FINDINGS:   Parenchyma: No infarction on DWI. FLAIR signal changes and mass effect in the left frontal lobe as detailed above.   Extra-axial Collection:  Status post suboccipital craniectomy with extracranial fluid collection in the suboccipital soft tissues measuring 5.9 x 5.5 x 2 cm (image 11, series 5; image 4, series 6) consistent with pseudomeningocele formation.   Ventricular System: No hydrocephalus.     > 50% of 60 min visit spent in chart review, face to face discussion of goals of care,  symptom assessment, coordination of care and emotional support.      Signature: Tad Westbrook MD

## 2022-01-04 LAB — GUARDANT 360: NORMAL

## 2022-01-05 PROCEDURE — 77412 RADIATION TX DELIVERY LVL 3: CPT | Performed by: RADIOLOGY

## 2022-01-05 PROCEDURE — 77417 THER RADIOLOGY PORT IMAGE(S): CPT | Performed by: RADIOLOGY

## 2022-01-06 DIAGNOSIS — C79.31 SECONDARY MALIGNANT NEOPLASM OF BRAIN: Primary | ICD-10-CM

## 2022-01-06 PROCEDURE — 77412 RADIATION TX DELIVERY LVL 3: CPT | Performed by: RADIOLOGY

## 2022-01-07 PROCEDURE — 77412 RADIATION TX DELIVERY LVL 3: CPT | Performed by: RADIOLOGY

## 2022-01-10 ENCOUNTER — LAB VISIT (OUTPATIENT)
Dept: LAB | Facility: HOSPITAL | Age: 62
End: 2022-01-10
Payer: MEDICARE

## 2022-01-10 ENCOUNTER — OFFICE VISIT (OUTPATIENT)
Dept: HEMATOLOGY/ONCOLOGY | Facility: CLINIC | Age: 62
End: 2022-01-10
Payer: MEDICARE

## 2022-01-10 VITALS
OXYGEN SATURATION: 97 % | SYSTOLIC BLOOD PRESSURE: 152 MMHG | DIASTOLIC BLOOD PRESSURE: 81 MMHG | WEIGHT: 121.94 LBS | BODY MASS INDEX: 19.14 KG/M2 | RESPIRATION RATE: 18 BRPM | HEART RATE: 123 BPM | HEIGHT: 67 IN

## 2022-01-10 DIAGNOSIS — C79.31 SECONDARY MALIGNANT NEOPLASM OF BRAIN: ICD-10-CM

## 2022-01-10 DIAGNOSIS — C34.91 PRIMARY ADENOCARCINOMA OF RIGHT LUNG: Primary | ICD-10-CM

## 2022-01-10 DIAGNOSIS — C34.91 PRIMARY ADENOCARCINOMA OF RIGHT LUNG: ICD-10-CM

## 2022-01-10 LAB
ALBUMIN SERPL BCP-MCNC: 2.8 G/DL (ref 3.5–5.2)
ALP SERPL-CCNC: 71 U/L (ref 55–135)
ALT SERPL W/O P-5'-P-CCNC: 20 U/L (ref 10–44)
ANION GAP SERPL CALC-SCNC: 9 MMOL/L (ref 8–16)
AST SERPL-CCNC: 16 U/L (ref 10–40)
BASOPHILS # BLD AUTO: 0.02 K/UL (ref 0–0.2)
BASOPHILS NFR BLD: 0.2 % (ref 0–1.9)
BILIRUB SERPL-MCNC: 0.5 MG/DL (ref 0.1–1)
BUN SERPL-MCNC: 10 MG/DL (ref 6–20)
CALCIUM SERPL-MCNC: 9.5 MG/DL (ref 8.7–10.5)
CHLORIDE SERPL-SCNC: 100 MMOL/L (ref 95–110)
CO2 SERPL-SCNC: 25 MMOL/L (ref 23–29)
CREAT SERPL-MCNC: 0.7 MG/DL (ref 0.5–1.4)
DIFFERENTIAL METHOD: ABNORMAL
EOSINOPHIL # BLD AUTO: 0.1 K/UL (ref 0–0.5)
EOSINOPHIL NFR BLD: 0.9 % (ref 0–8)
ERYTHROCYTE [DISTWIDTH] IN BLOOD BY AUTOMATED COUNT: 15 % (ref 11.5–14.5)
EST. GFR  (AFRICAN AMERICAN): >60 ML/MIN/1.73 M^2
EST. GFR  (NON AFRICAN AMERICAN): >60 ML/MIN/1.73 M^2
GLUCOSE SERPL-MCNC: 87 MG/DL (ref 70–110)
HCT VFR BLD AUTO: 35 % (ref 40–54)
HGB BLD-MCNC: 11.7 G/DL (ref 14–18)
IMM GRANULOCYTES # BLD AUTO: 0.1 K/UL (ref 0–0.04)
IMM GRANULOCYTES NFR BLD AUTO: 1.2 % (ref 0–0.5)
LYMPHOCYTES # BLD AUTO: 0.9 K/UL (ref 1–4.8)
LYMPHOCYTES NFR BLD: 11.5 % (ref 18–48)
MCH RBC QN AUTO: 31.4 PG (ref 27–31)
MCHC RBC AUTO-ENTMCNC: 33.4 G/DL (ref 32–36)
MCV RBC AUTO: 94 FL (ref 82–98)
MONOCYTES # BLD AUTO: 0.3 K/UL (ref 0.3–1)
MONOCYTES NFR BLD: 4.2 % (ref 4–15)
NEUTROPHILS # BLD AUTO: 6.7 K/UL (ref 1.8–7.7)
NEUTROPHILS NFR BLD: 82 % (ref 38–73)
NRBC BLD-RTO: 0 /100 WBC
PLATELET # BLD AUTO: 250 K/UL (ref 150–450)
PMV BLD AUTO: 10 FL (ref 9.2–12.9)
POTASSIUM SERPL-SCNC: 4.6 MMOL/L (ref 3.5–5.1)
PROT SERPL-MCNC: 6.1 G/DL (ref 6–8.4)
RBC # BLD AUTO: 3.73 M/UL (ref 4.6–6.2)
SODIUM SERPL-SCNC: 134 MMOL/L (ref 136–145)
WBC # BLD AUTO: 8.15 K/UL (ref 3.9–12.7)

## 2022-01-10 PROCEDURE — 36415 COLL VENOUS BLD VENIPUNCTURE: CPT | Performed by: STUDENT IN AN ORGANIZED HEALTH CARE EDUCATION/TRAINING PROGRAM

## 2022-01-10 PROCEDURE — 3077F PR MOST RECENT SYSTOLIC BLOOD PRESSURE >= 140 MM HG: ICD-10-PCS | Mod: CPTII,GC,S$GLB, | Performed by: STUDENT IN AN ORGANIZED HEALTH CARE EDUCATION/TRAINING PROGRAM

## 2022-01-10 PROCEDURE — 99215 PR OFFICE/OUTPT VISIT, EST, LEVL V, 40-54 MIN: ICD-10-PCS | Mod: GC,S$GLB,, | Performed by: STUDENT IN AN ORGANIZED HEALTH CARE EDUCATION/TRAINING PROGRAM

## 2022-01-10 PROCEDURE — 1111F DSCHRG MED/CURRENT MED MERGE: CPT | Mod: CPTII,GC,S$GLB, | Performed by: STUDENT IN AN ORGANIZED HEALTH CARE EDUCATION/TRAINING PROGRAM

## 2022-01-10 PROCEDURE — 80053 COMPREHEN METABOLIC PANEL: CPT | Performed by: STUDENT IN AN ORGANIZED HEALTH CARE EDUCATION/TRAINING PROGRAM

## 2022-01-10 PROCEDURE — 1111F PR DISCHARGE MEDS RECONCILED W/ CURRENT OUTPATIENT MED LIST: ICD-10-PCS | Mod: CPTII,GC,S$GLB, | Performed by: STUDENT IN AN ORGANIZED HEALTH CARE EDUCATION/TRAINING PROGRAM

## 2022-01-10 PROCEDURE — 99999 PR PBB SHADOW E&M-EST. PATIENT-LVL IV: CPT | Mod: PBBFAC,GC,, | Performed by: STUDENT IN AN ORGANIZED HEALTH CARE EDUCATION/TRAINING PROGRAM

## 2022-01-10 PROCEDURE — 85025 COMPLETE CBC W/AUTO DIFF WBC: CPT | Performed by: STUDENT IN AN ORGANIZED HEALTH CARE EDUCATION/TRAINING PROGRAM

## 2022-01-10 PROCEDURE — 1159F PR MEDICATION LIST DOCUMENTED IN MEDICAL RECORD: ICD-10-PCS | Mod: CPTII,GC,S$GLB, | Performed by: STUDENT IN AN ORGANIZED HEALTH CARE EDUCATION/TRAINING PROGRAM

## 2022-01-10 PROCEDURE — 77412 RADIATION TX DELIVERY LVL 3: CPT | Performed by: RADIOLOGY

## 2022-01-10 PROCEDURE — 3079F PR MOST RECENT DIASTOLIC BLOOD PRESSURE 80-89 MM HG: ICD-10-PCS | Mod: CPTII,GC,S$GLB, | Performed by: STUDENT IN AN ORGANIZED HEALTH CARE EDUCATION/TRAINING PROGRAM

## 2022-01-10 PROCEDURE — 1159F MED LIST DOCD IN RCRD: CPT | Mod: CPTII,GC,S$GLB, | Performed by: STUDENT IN AN ORGANIZED HEALTH CARE EDUCATION/TRAINING PROGRAM

## 2022-01-10 PROCEDURE — 3008F PR BODY MASS INDEX (BMI) DOCUMENTED: ICD-10-PCS | Mod: CPTII,GC,S$GLB, | Performed by: STUDENT IN AN ORGANIZED HEALTH CARE EDUCATION/TRAINING PROGRAM

## 2022-01-10 PROCEDURE — 99999 PR PBB SHADOW E&M-EST. PATIENT-LVL IV: ICD-10-PCS | Mod: PBBFAC,GC,, | Performed by: STUDENT IN AN ORGANIZED HEALTH CARE EDUCATION/TRAINING PROGRAM

## 2022-01-10 PROCEDURE — 1160F RVW MEDS BY RX/DR IN RCRD: CPT | Mod: CPTII,GC,S$GLB, | Performed by: STUDENT IN AN ORGANIZED HEALTH CARE EDUCATION/TRAINING PROGRAM

## 2022-01-10 PROCEDURE — 3008F BODY MASS INDEX DOCD: CPT | Mod: CPTII,GC,S$GLB, | Performed by: STUDENT IN AN ORGANIZED HEALTH CARE EDUCATION/TRAINING PROGRAM

## 2022-01-10 PROCEDURE — 3079F DIAST BP 80-89 MM HG: CPT | Mod: CPTII,GC,S$GLB, | Performed by: STUDENT IN AN ORGANIZED HEALTH CARE EDUCATION/TRAINING PROGRAM

## 2022-01-10 PROCEDURE — 99215 OFFICE O/P EST HI 40 MIN: CPT | Mod: GC,S$GLB,, | Performed by: STUDENT IN AN ORGANIZED HEALTH CARE EDUCATION/TRAINING PROGRAM

## 2022-01-10 PROCEDURE — 3077F SYST BP >= 140 MM HG: CPT | Mod: CPTII,GC,S$GLB, | Performed by: STUDENT IN AN ORGANIZED HEALTH CARE EDUCATION/TRAINING PROGRAM

## 2022-01-10 PROCEDURE — 1160F PR REVIEW ALL MEDS BY PRESCRIBER/CLIN PHARMACIST DOCUMENTED: ICD-10-PCS | Mod: CPTII,GC,S$GLB, | Performed by: STUDENT IN AN ORGANIZED HEALTH CARE EDUCATION/TRAINING PROGRAM

## 2022-01-10 RX ORDER — IBUPROFEN 800 MG/1
800 TABLET ORAL EVERY 6 HOURS PRN
COMMUNITY
Start: 2021-08-24

## 2022-01-10 RX ORDER — CHLORHEXIDINE GLUCONATE ORAL RINSE 1.2 MG/ML
SOLUTION DENTAL
COMMUNITY

## 2022-01-10 RX ORDER — INFLUENZA A VIRUS A/VICTORIA/2570/2019 IVR-215 (H1N1) ANTIGEN (PROPIOLACTONE INACTIVATED), INFLUENZA A VIRUS A/CAMBODIA/E0826360/2020 IVR-224 (H3N2) ANTIGEN (PROPIOLACTONE INACTIVATED), INFLUENZA B VIRUS B/VICTORIA/705/2018 BVR-11 ANTIGEN (PROPIOLACTONE INACTIVATED), INFLUENZA B VIRUS B/PHUKET/3073/2013 BVR-1B ANTIGEN (PROPIOLACTONE INACTIVATED) 15; 15; 15; 15 UG/.5ML; UG/.5ML; UG/.5ML; UG/.5ML
INJECTION, SUSPENSION INTRAMUSCULAR
COMMUNITY

## 2022-01-10 RX ORDER — PREGABALIN 75 MG/1
CAPSULE ORAL
COMMUNITY
Start: 2021-07-19

## 2022-01-10 RX ORDER — OXYCODONE AND ACETAMINOPHEN 5; 325 MG/1; MG/1
1 TABLET ORAL 2 TIMES DAILY PRN
COMMUNITY
Start: 2021-10-14

## 2022-01-10 RX ORDER — DICLOFENAC SODIUM 75 MG/1
TABLET, DELAYED RELEASE ORAL
COMMUNITY

## 2022-01-10 RX ORDER — PROMETHAZINE HYDROCHLORIDE 12.5 MG/1
12.5 TABLET ORAL EVERY 6 HOURS PRN
COMMUNITY
Start: 2021-12-10

## 2022-01-10 RX ORDER — OXYCODONE AND ACETAMINOPHEN 7.5; 325 MG/1; MG/1
1 TABLET ORAL 2 TIMES DAILY PRN
COMMUNITY
Start: 2021-12-29

## 2022-01-10 RX ORDER — GABAPENTIN 300 MG/1
300 CAPSULE ORAL 2 TIMES DAILY
COMMUNITY
Start: 2021-07-20

## 2022-01-10 NOTE — Clinical Note
- CBC, CMP, and chair for cycle 1 docetaxel next Monday (does not need to see me) - CBC, CMP, follow up visit, and chemo chair for cycle 2 docetaxel on 2/7/22 - thank you!

## 2022-01-10 NOTE — PROGRESS NOTES
HEMATOLOGY ONCOLOGY FELLOW CLINIC    HISTORY OF PRESENT ILLNESS:    Initial Consult:  Mr. Kvng Edwards is a 61 year old male with metastatic lung adenocarcinoma who presents to transfer his cancer care from [facility] to [facility].     Mr. Edwards was recently admitted from 12/14/21-12/17/21. He was in the hospital visiting his significant other, who is also hospitalized, when a loud thump was heard in the hospital bathroom. The nurse opened the bathroom door and found him seizing. He was brought to the ED and MRI brain revealed multiple intracranial masses concerning for worsening metastatic disease.    Mr. Edwards was diagnosed with lung cancer in February 2021. He was treated with concurrent chemoradiation at [facility] by Dr. Charles, which was completed on 5/6/2021. He was then started on maintenance durvalumab. He then developed symptoms of right hand and leg weakness and poor coordination, which prompted brain imaging. MRI brain showed a 5.2cm mass as well as three other lesions in the left middle fontal gyrus, left precentral gyrus, left occipital lobe. CT chest was concerning for progressive disease in the bilateral lungs. He underwent surgical resection of the 5.2cm mass on 8/5/21. PET scan on 8/13/21 showed multiple new hypermetabolic pulmonary nodules in both lungs over the interim consistent with progressive metastatic disease. He then completed palliative radiation to the brain in September 2021. Palliative gemcitabine was started on 9/28/2021.     Gemcitabine was held on 11/18/21 when worsening brain metastases were noted, with the intent to focus on brain radiation. However, after the patient met with radiation oncology, gemcitabine was restarted on 12/8 with the intent to repeat brain MRI after a few weeks and to possibly offer whole brain radiation.       From Dr. Charles's last note 11/18/2021:    CARIS-- without actionable mutations. tp53 mutation in exon 7 (likely pathogenic)and exon 10 (pathogenic variant)   tmb  9  CYNTHIA low 8%    INTERVAL HISTORY:     Presents to follow up for metastatic lung adenocarcinoma. Since last visit, he saw Dr. Dawkins and plans on WBRT in 5 fractions. We discussed next line of treatment, docetaxel, and will proceed with treatment when WBRT is complete. Overall symptoms of right-sided weakness have improved significantly. Very fatigued since starting radiation and has had some headaches.       Past Medical History:   Diagnosis Date    Depression        Family History   Problem Relation Age of Onset    Lung cancer Mother     Pancreatic cancer Father     Cancer Sister     Brain cancer Brother     Cancer Maternal Grandmother     Cancer Maternal Grandfather     Cancer Paternal Grandmother     Cancer Paternal Grandfather        Social History     Socioeconomic History    Marital status: Single   Tobacco Use    Smoking status: Former Smoker    Smokeless tobacco: Never Used   Substance and Sexual Activity    Alcohol use: Not Currently         MEDICATIONS:     Current Outpatient Medications on File Prior to Visit   Medication Sig Dispense Refill    ALPRAZolam (XANAX) 1 MG tablet Take 1 tablet (1 mg total) by mouth 3 (three) times daily as needed for Anxiety. 90 tablet 0    butalbital-acetaminophen-caffeine -40 mg (FIORICET, ESGIC) -40 mg per tablet Take 1 tablet by mouth every 4 (four) hours as needed for Pain or Headaches. 60 tablet 0    chlorhexidine (PERIDEX) 0.12 % solution       dexAMETHasone (DECADRON) 4 MG Tab Take 1 tablet (4 mg total) by mouth once daily. 30 tablet 0    diclofenac (VOLTAREN) 75 MG EC tablet TK 1 T PO  BID   PRF  PAIN      famotidine (PEPCID) 20 MG tablet Take 20 mg by mouth 2 (two) times daily.      flu vac [id number] 36mos up,PF, (AFLURIA QD 2021-22,3YR UP,,PF,) 60 mcg (15 mcg x 4)/0.5 mL Syrg ADM 0.5ML IM UTD      gabapentin (NEURONTIN) 300 MG capsule Take 300 mg by mouth 2 (two) times daily.      ibuprofen (ADVIL,MOTRIN) 800 MG tablet Take 800 mg  "by mouth every 6 (six) hours as needed.      levETIRAcetam (KEPPRA) 500 MG Tab Take 1 tablet (500 mg total) by mouth 2 (two) times daily. 60 tablet 11    oxyCODONE (ROXICODONE) 5 MG immediate release tablet Take 1 tablet (5 mg total) by mouth every 4 (four) hours as needed for Pain. 120 tablet 0    oxyCODONE-acetaminophen (PERCOCET) 5-325 mg per tablet Take 1 tablet by mouth 2 (two) times daily as needed.      oxyCODONE-acetaminophen (PERCOCET) 7.5-325 mg per tablet Take 1 tablet by mouth 2 (two) times daily as needed.      pregabalin (LYRICA) 75 MG capsule TAKE ONE CAPSULE BY MOUTH TWICE DAILY FOR NEUROPATHIC PAIN      promethazine (PHENERGAN) 12.5 MG Tab Take 12.5 mg by mouth every 6 (six) hours as needed.       No current facility-administered medications on file prior to visit.       ALLERGIES: Review of patient's allergies indicates:  No Known Allergies     ROS:       Review of Systems   Constitutional: Positive for fatigue. Negative for chills and fever.   HENT:   Negative for nosebleeds and sore throat.    Respiratory: Negative for cough and shortness of breath.    Cardiovascular: Negative for chest pain and palpitations.   Gastrointestinal: Negative for abdominal pain, diarrhea, nausea and vomiting.   Genitourinary: Negative for dysuria and hematuria.    Musculoskeletal: Negative for back pain and neck pain.   Skin: Negative for rash and wound.   Neurological: Positive for headaches. Negative for light-headedness.   Hematological: Negative for adenopathy. Does not bruise/bleed easily.   Psychiatric/Behavioral: Negative for depression. The patient is not nervous/anxious.        PHYSICAL EXAM:  Vitals:    01/10/22 1507   BP: (!) 152/81   Pulse: (!) 123   Resp: 18   SpO2: 97%   Weight: 55.3 kg (121 lb 14.6 oz)   Height: 5' 7" (1.702 m)   PainSc: 10-Worst pain ever   PainLoc: Head       Physical Exam  Constitutional:       General: He is not in acute distress.     Appearance: He is well-developed. He is " not diaphoretic.   HENT:      Head: Normocephalic and atraumatic.   Eyes:      General: No scleral icterus.     Conjunctiva/sclera: Conjunctivae normal.   Cardiovascular:      Rate and Rhythm: Normal rate and regular rhythm.      Heart sounds: Normal heart sounds. No murmur heard.  No friction rub. No gallop.    Pulmonary:      Effort: Pulmonary effort is normal. No respiratory distress.      Breath sounds: Normal breath sounds. No wheezing or rales.   Abdominal:      General: Bowel sounds are normal. There is no distension.      Palpations: Abdomen is soft.      Tenderness: There is no abdominal tenderness. There is no guarding.   Musculoskeletal:         General: Normal range of motion.      Cervical back: Normal range of motion and neck supple.      Comments: RUE: 4/5 strength. Remainder of extremities 5/5 strength   Lymphadenopathy:      Cervical: No cervical adenopathy.   Skin:     General: Skin is warm and dry.      Findings: No rash.   Neurological:      General: No focal deficit present.      Mental Status: He is alert and oriented to person, place, and time.   Psychiatric:         Mood and Affect: Mood normal.         Behavior: Behavior normal.         LAB:   Results for orders placed or performed in visit on 01/10/22   CBC Auto Differential   Result Value Ref Range    WBC 8.15 3.90 - 12.70 K/uL    RBC 3.73 (L) 4.60 - 6.20 M/uL    Hemoglobin 11.7 (L) 14.0 - 18.0 g/dL    Hematocrit 35.0 (L) 40.0 - 54.0 %    MCV 94 82 - 98 fL    MCH 31.4 (H) 27.0 - 31.0 pg    MCHC 33.4 32.0 - 36.0 g/dL    RDW 15.0 (H) 11.5 - 14.5 %    Platelets 250 150 - 450 K/uL    MPV 10.0 9.2 - 12.9 fL    Immature Granulocytes 1.2 (H) 0.0 - 0.5 %    Gran # (ANC) 6.7 1.8 - 7.7 K/uL    Immature Grans (Abs) 0.10 (H) 0.00 - 0.04 K/uL    Lymph # 0.9 (L) 1.0 - 4.8 K/uL    Mono # 0.3 0.3 - 1.0 K/uL    Eos # 0.1 0.0 - 0.5 K/uL    Baso # 0.02 0.00 - 0.20 K/uL    nRBC 0 0 /100 WBC    Gran % 82.0 (H) 38.0 - 73.0 %    Lymph % 11.5 (L) 18.0 - 48.0 %     Mono % 4.2 4.0 - 15.0 %    Eosinophil % 0.9 0.0 - 8.0 %    Basophil % 0.2 0.0 - 1.9 %    Differential Method Automated    Comprehensive Metabolic Panel   Result Value Ref Range    Sodium 134 (L) 136 - 145 mmol/L    Potassium 4.6 3.5 - 5.1 mmol/L    Chloride 100 95 - 110 mmol/L    CO2 25 23 - 29 mmol/L    Glucose 87 70 - 110 mg/dL    BUN 10 6 - 20 mg/dL    Creatinine 0.7 0.5 - 1.4 mg/dL    Calcium 9.5 8.7 - 10.5 mg/dL    Total Protein 6.1 6.0 - 8.4 g/dL    Albumin 2.8 (L) 3.5 - 5.2 g/dL    Total Bilirubin 0.5 0.1 - 1.0 mg/dL    Alkaline Phosphatase 71 55 - 135 U/L    AST 16 10 - 40 U/L    ALT 20 10 - 44 U/L    Anion Gap 9 8 - 16 mmol/L    eGFR if African American >60.0 >60 mL/min/1.73 m^2    eGFR if non African American >60.0 >60 mL/min/1.73 m^2     MRI Brain 11/15/21:   EXAM: [facility] MRI BRAIN PERFUSION W WO CONTRAST     CLINICAL INDICATION:  Brain/CNS neoplasm, surveillance. Metastatic lung cancer. Follow-up.     TECHNIQUE: Pre-contrast and postcontrast acquisitions of the brain were obtained. Dynamic susceptibility contrast (DSC) perfusion-w images were obtained. Intravenous contrast was administered for the performance of this exam.     COMPARISON: MRI brain 8/24/2021, 8/6/2021, 8/4/2021, 3/5/2021     FINDINGS:   TUMOR:   Location:   *   Interval expected evolution of immediate postsurgical changes in the right cerebellum at site of prior mass resection with interval progression of rim-enhancement at the surgical site measuring in total 3.8 x 2.1 cm (image 9, series 42). Residual hyperintense T2 blood products within the posterior aspect of the resection cavity. Findings represent a combination of postsurgical change, blood products, and residual tumor. The resection cavity alone measured 2.6 x 2.2 cm on 8/24/2021 with suspected residual tumor on current exam along the lateral aspect of the resection cavity (image 9, series 42) measuring 1.5 x 1.6 cm (image 70, series 46). Overall interval improved mass effect  within the posterior fossa with no herniation or midline shift.     *   Interval progression of rim-enhancing lesion inseparable from the left occipital horn measuring 1.9 x 1 cm (image 11, series 42), previously measured 1.1 x 0.5 cm on 8/24/2021. Associated worsening hyperintense T2/FLAIR signal within the left periatrial white matter.   *   Interval progression of rim-enhancing lesion within the left middle frontal gyrus measuring 2.4 x 2.5 cm (image 21, series 42), previously measured 1.2 x 1.2 cm on 8/24/2021. Associated worsening hyperintense T2/FLAIR signal throughout the left superior and middle frontal gyri.   *   Interval progression of rim-enhancing lesion within the left precentral gyrus measuring 1.8 x 1.5 cm (image 21, series 42), previously measured 0.7 x 0.6 cm on 8/24/2021. Associated worsening hyperintense T2/FLAIR signal within the posterior left frontal lobe.   *   Interval progression of rim-enhancing lesion in the right precentral gyrus measuring 1.6 x 1.4 cm (image 21, series 42), previously measured 0.4 x 0.3 cm on 8/24/2021. Associated worsening hyperintense T2/FLAIR signal in the right precentral gyrus.   *   New extra-axial enhancing lesion along the undersurface of the left tentorial leaflet with wide dural interface measures 1.2 x 0.7 x 1.9 cm (image 15, series 33; image 9, series 42).   *   New extra-axial lesion along the lateral right cerebellar convexity with wide dural interface measures 0.9 x 0.6 cm (image 62, series 46).     FLAIR: Progression of multifocal nonenhancing FLAIR signal abnormality within the left frontal lobe, right frontal lobe, and periatrial white matter consistent with worsened vasogenic edema surrounding brain metastases. Mild mass effect in the left frontal lobe without mass effect or herniation.     Diffusion: No diffusion abnormality to suggest hypercellular tumor.     Perfusion: Abnormal hyperperfusion is not well characterized given thin rim enhancement.      Other: Interval development of susceptibility artifact within enlarging, rim-enhancing lesions in the frontal lobes consistent with intralesional hemorrhage. No herniation.       ADDITIONAL FINDINGS:   Parenchyma: No infarction on DWI. FLAIR signal changes and mass effect in the left frontal lobe as detailed above.   Extra-axial Collection:  Status post suboccipital craniectomy with extracranial fluid collection in the suboccipital soft tissues measuring 5.9 x 5.5 x 2 cm (image 11, series 5; image 4, series 6) consistent with pseudomeningocele formation.   Ventricular System: No hydrocephalus.       PROBLEMS ASSESSED THIS VISIT:    1. Primary adenocarcinoma of right lung    2. Secondary malignant neoplasm of brain        ASSESSMENT AND PLAN    Metastatic Lung Adenocarcinoma  Lung adenocarcinoma with no targetable mutations. 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   Guardant 360 sent; no actionable mutations and MSI stable    - saw Dr. Westbrook with palliative care on 1/3. He wants to pursue all available treatments.   - refer to psych onc  - WBRT with Dr. Dawkins, will get fraction 5/5 tomorrow  - plan to start palliative docetaxel when WBRT is completed     Seizure due to brain metastases  - continue keppra  - completed course of dexamethasone taper and symptoms much improved    Follow up:   - CBC, CMP, and chair for cycle 1 docetaxel next Monday (does not need to see me)  - CBC, CMP, follow up visit, and chemo chair for cycle 2 docetaxel on 2/7/22    Discussed with Dr. Cammy Yu, DO  PGY-IV  Hematology/Oncology Fellow

## 2022-01-11 PROCEDURE — 77412 RADIATION TX DELIVERY LVL 3: CPT | Performed by: RADIOLOGY

## 2022-01-11 PROCEDURE — 77336 RADIATION PHYSICS CONSULT: CPT | Performed by: RADIOLOGY

## 2022-01-19 ENCOUNTER — EXTERNAL HOME HEALTH (OUTPATIENT)
Dept: HOME HEALTH SERVICES | Facility: HOSPITAL | Age: 62
End: 2022-01-19
Payer: MEDICARE

## 2022-01-24 ENCOUNTER — INFUSION (OUTPATIENT)
Dept: INFUSION THERAPY | Facility: HOSPITAL | Age: 62
End: 2022-01-24
Payer: MEDICARE

## 2022-01-24 ENCOUNTER — LAB VISIT (OUTPATIENT)
Dept: LAB | Facility: HOSPITAL | Age: 62
End: 2022-01-24
Payer: MEDICARE

## 2022-01-24 VITALS
WEIGHT: 121.94 LBS | HEIGHT: 67 IN | HEART RATE: 108 BPM | DIASTOLIC BLOOD PRESSURE: 76 MMHG | TEMPERATURE: 98 F | BODY MASS INDEX: 19.14 KG/M2 | RESPIRATION RATE: 18 BRPM | SYSTOLIC BLOOD PRESSURE: 130 MMHG

## 2022-01-24 DIAGNOSIS — C79.31 SECONDARY MALIGNANT NEOPLASM OF BRAIN: ICD-10-CM

## 2022-01-24 DIAGNOSIS — C34.90 PRIMARY ADENOCARCINOMA OF LUNG, UNSPECIFIED LATERALITY: Primary | ICD-10-CM

## 2022-01-24 DIAGNOSIS — C34.91 PRIMARY ADENOCARCINOMA OF RIGHT LUNG: ICD-10-CM

## 2022-01-24 PROBLEM — R51.9 CEPHALGIA: Status: ACTIVE | Noted: 2022-01-24

## 2022-01-24 LAB
ALBUMIN SERPL BCP-MCNC: 3.4 G/DL (ref 3.5–5.2)
ALP SERPL-CCNC: 75 U/L (ref 55–135)
ALT SERPL W/O P-5'-P-CCNC: 20 U/L (ref 10–44)
ANION GAP SERPL CALC-SCNC: 12 MMOL/L (ref 8–16)
AST SERPL-CCNC: 20 U/L (ref 10–40)
BASOPHILS # BLD AUTO: 0.03 K/UL (ref 0–0.2)
BASOPHILS NFR BLD: 0.3 % (ref 0–1.9)
BILIRUB SERPL-MCNC: 0.6 MG/DL (ref 0.1–1)
BUN SERPL-MCNC: 10 MG/DL (ref 6–20)
CALCIUM SERPL-MCNC: 10.8 MG/DL (ref 8.7–10.5)
CHLORIDE SERPL-SCNC: 101 MMOL/L (ref 95–110)
CO2 SERPL-SCNC: 24 MMOL/L (ref 23–29)
CREAT SERPL-MCNC: 0.8 MG/DL (ref 0.5–1.4)
DIFFERENTIAL METHOD: ABNORMAL
EOSINOPHIL # BLD AUTO: 0 K/UL (ref 0–0.5)
EOSINOPHIL NFR BLD: 0.1 % (ref 0–8)
ERYTHROCYTE [DISTWIDTH] IN BLOOD BY AUTOMATED COUNT: 14.6 % (ref 11.5–14.5)
EST. GFR  (AFRICAN AMERICAN): >60 ML/MIN/1.73 M^2
EST. GFR  (NON AFRICAN AMERICAN): >60 ML/MIN/1.73 M^2
GLUCOSE SERPL-MCNC: 146 MG/DL (ref 70–110)
HCT VFR BLD AUTO: 36.6 % (ref 40–54)
HGB BLD-MCNC: 12 G/DL (ref 14–18)
IMM GRANULOCYTES # BLD AUTO: 0.09 K/UL (ref 0–0.04)
IMM GRANULOCYTES NFR BLD AUTO: 0.9 % (ref 0–0.5)
LYMPHOCYTES # BLD AUTO: 0.9 K/UL (ref 1–4.8)
LYMPHOCYTES NFR BLD: 9.1 % (ref 18–48)
MCH RBC QN AUTO: 30.2 PG (ref 27–31)
MCHC RBC AUTO-ENTMCNC: 32.8 G/DL (ref 32–36)
MCV RBC AUTO: 92 FL (ref 82–98)
MONOCYTES # BLD AUTO: 0.3 K/UL (ref 0.3–1)
MONOCYTES NFR BLD: 2.8 % (ref 4–15)
NEUTROPHILS # BLD AUTO: 8.4 K/UL (ref 1.8–7.7)
NEUTROPHILS NFR BLD: 86.8 % (ref 38–73)
NRBC BLD-RTO: 0 /100 WBC
PLATELET # BLD AUTO: 421 K/UL (ref 150–450)
PMV BLD AUTO: 9.4 FL (ref 9.2–12.9)
POTASSIUM SERPL-SCNC: 5 MMOL/L (ref 3.5–5.1)
PROT SERPL-MCNC: 7.9 G/DL (ref 6–8.4)
RBC # BLD AUTO: 3.98 M/UL (ref 4.6–6.2)
SODIUM SERPL-SCNC: 137 MMOL/L (ref 136–145)
WBC # BLD AUTO: 9.68 K/UL (ref 3.9–12.7)

## 2022-01-24 PROCEDURE — A4216 STERILE WATER/SALINE, 10 ML: HCPCS | Performed by: INTERNAL MEDICINE

## 2022-01-24 PROCEDURE — 96367 TX/PROPH/DG ADDL SEQ IV INF: CPT

## 2022-01-24 PROCEDURE — 63600175 PHARM REV CODE 636 W HCPCS: Performed by: INTERNAL MEDICINE

## 2022-01-24 PROCEDURE — 25000003 PHARM REV CODE 250: Performed by: INTERNAL MEDICINE

## 2022-01-24 PROCEDURE — 96413 CHEMO IV INFUSION 1 HR: CPT

## 2022-01-24 PROCEDURE — 36415 COLL VENOUS BLD VENIPUNCTURE: CPT | Performed by: STUDENT IN AN ORGANIZED HEALTH CARE EDUCATION/TRAINING PROGRAM

## 2022-01-24 PROCEDURE — 80053 COMPREHEN METABOLIC PANEL: CPT | Performed by: STUDENT IN AN ORGANIZED HEALTH CARE EDUCATION/TRAINING PROGRAM

## 2022-01-24 PROCEDURE — 85025 COMPLETE CBC W/AUTO DIFF WBC: CPT | Performed by: STUDENT IN AN ORGANIZED HEALTH CARE EDUCATION/TRAINING PROGRAM

## 2022-01-24 RX ORDER — SODIUM CHLORIDE 0.9 % (FLUSH) 0.9 %
10 SYRINGE (ML) INJECTION
Status: CANCELLED | OUTPATIENT
Start: 2022-01-24

## 2022-01-24 RX ORDER — HEPARIN 100 UNIT/ML
500 SYRINGE INTRAVENOUS
Status: CANCELLED | OUTPATIENT
Start: 2022-01-24

## 2022-01-24 RX ORDER — HEPARIN 100 UNIT/ML
500 SYRINGE INTRAVENOUS
Status: DISCONTINUED | OUTPATIENT
Start: 2022-01-24 | End: 2022-01-24 | Stop reason: HOSPADM

## 2022-01-24 RX ORDER — SODIUM CHLORIDE 0.9 % (FLUSH) 0.9 %
10 SYRINGE (ML) INJECTION
Status: DISCONTINUED | OUTPATIENT
Start: 2022-01-24 | End: 2022-01-24 | Stop reason: HOSPADM

## 2022-01-24 RX ADMIN — DEXAMETHASONE SODIUM PHOSPHATE 20 MG: 4 INJECTION, SOLUTION INTRA-ARTICULAR; INTRALESIONAL; INTRAMUSCULAR; INTRAVENOUS; SOFT TISSUE at 08:01

## 2022-01-24 RX ADMIN — DOCETAXEL ANHYDROUS 120 MG: 10 INJECTION, SOLUTION INTRAVENOUS at 09:01

## 2022-01-24 RX ADMIN — SODIUM CHLORIDE: 0.9 INJECTION, SOLUTION INTRAVENOUS at 08:01

## 2022-01-24 RX ADMIN — HEPARIN 500 UNITS: 100 SYRINGE at 10:01

## 2022-01-24 RX ADMIN — Medication 10 ML: at 10:01

## 2022-01-24 NOTE — PLAN OF CARE
0830 pt here for D1C1 Taxotere infuison, labs, hx, meds, allergies reviewed, pt with no new complaints at this time, reclined in chair, continue to monitor

## 2022-01-24 NOTE — PLAN OF CARE
1045 pt tolerated taxotere infusion without issue, pt to rtc 2/7/22, no distress noted upon d/c to home

## 2022-02-02 RX ORDER — BUTALBITAL, ACETAMINOPHEN AND CAFFEINE 50; 325; 40 MG/1; MG/1; MG/1
1 TABLET ORAL EVERY 4 HOURS PRN
Qty: 60 TABLET | Refills: 0 | Status: SHIPPED | OUTPATIENT
Start: 2022-02-02 | End: 2022-02-24 | Stop reason: SDUPTHER

## 2022-02-02 RX ORDER — ALPRAZOLAM 1 MG/1
1 TABLET ORAL 3 TIMES DAILY PRN
Qty: 90 TABLET | Refills: 0 | Status: SHIPPED | OUTPATIENT
Start: 2022-02-02 | End: 2022-02-27 | Stop reason: SDUPTHER

## 2022-02-02 RX ORDER — DEXAMETHASONE 4 MG/1
4 TABLET ORAL DAILY
Qty: 30 TABLET | Refills: 0 | Status: SHIPPED | OUTPATIENT
Start: 2022-02-02 | End: 2022-03-06

## 2022-02-04 RX ORDER — OXYCODONE HYDROCHLORIDE 5 MG/1
5 TABLET ORAL EVERY 4 HOURS PRN
Qty: 120 TABLET | Refills: 0 | Status: SHIPPED | OUTPATIENT
Start: 2022-02-04 | End: 2022-03-06

## 2022-02-07 ENCOUNTER — INFUSION (OUTPATIENT)
Dept: INFUSION THERAPY | Facility: HOSPITAL | Age: 62
End: 2022-02-07
Payer: MEDICARE

## 2022-02-07 ENCOUNTER — LAB VISIT (OUTPATIENT)
Dept: LAB | Facility: HOSPITAL | Age: 62
End: 2022-02-07
Payer: MEDICARE

## 2022-02-07 ENCOUNTER — OFFICE VISIT (OUTPATIENT)
Dept: HEMATOLOGY/ONCOLOGY | Facility: CLINIC | Age: 62
End: 2022-02-07
Payer: MEDICARE

## 2022-02-07 VITALS
HEIGHT: 67 IN | HEART RATE: 133 BPM | TEMPERATURE: 98 F | OXYGEN SATURATION: 98 % | SYSTOLIC BLOOD PRESSURE: 148 MMHG | BODY MASS INDEX: 18.72 KG/M2 | WEIGHT: 119.25 LBS | RESPIRATION RATE: 18 BRPM | DIASTOLIC BLOOD PRESSURE: 89 MMHG

## 2022-02-07 VITALS
DIASTOLIC BLOOD PRESSURE: 76 MMHG | OXYGEN SATURATION: 100 % | SYSTOLIC BLOOD PRESSURE: 152 MMHG | HEART RATE: 116 BPM | RESPIRATION RATE: 18 BRPM | TEMPERATURE: 98 F

## 2022-02-07 DIAGNOSIS — C79.31 SECONDARY MALIGNANT NEOPLASM OF BRAIN: ICD-10-CM

## 2022-02-07 DIAGNOSIS — C34.90 PRIMARY ADENOCARCINOMA OF LUNG, UNSPECIFIED LATERALITY: Primary | ICD-10-CM

## 2022-02-07 DIAGNOSIS — C34.91 PRIMARY ADENOCARCINOMA OF RIGHT LUNG: ICD-10-CM

## 2022-02-07 DIAGNOSIS — C34.91 PRIMARY ADENOCARCINOMA OF RIGHT LUNG: Primary | ICD-10-CM

## 2022-02-07 LAB
ALBUMIN SERPL BCP-MCNC: 3.5 G/DL (ref 3.5–5.2)
ALP SERPL-CCNC: 88 U/L (ref 55–135)
ALT SERPL W/O P-5'-P-CCNC: 12 U/L (ref 10–44)
ANION GAP SERPL CALC-SCNC: 8 MMOL/L (ref 8–16)
AST SERPL-CCNC: 14 U/L (ref 10–40)
BASOPHILS # BLD AUTO: 0.02 K/UL (ref 0–0.2)
BASOPHILS NFR BLD: 0.5 % (ref 0–1.9)
BILIRUB SERPL-MCNC: 0.2 MG/DL (ref 0.1–1)
BUN SERPL-MCNC: 9 MG/DL (ref 6–20)
CALCIUM SERPL-MCNC: 10.5 MG/DL (ref 8.7–10.5)
CHLORIDE SERPL-SCNC: 105 MMOL/L (ref 95–110)
CO2 SERPL-SCNC: 28 MMOL/L (ref 23–29)
CREAT SERPL-MCNC: 0.7 MG/DL (ref 0.5–1.4)
DIFFERENTIAL METHOD: ABNORMAL
EOSINOPHIL # BLD AUTO: 0 K/UL (ref 0–0.5)
EOSINOPHIL NFR BLD: 0.2 % (ref 0–8)
ERYTHROCYTE [DISTWIDTH] IN BLOOD BY AUTOMATED COUNT: 15.5 % (ref 11.5–14.5)
EST. GFR  (AFRICAN AMERICAN): >60 ML/MIN/1.73 M^2
EST. GFR  (NON AFRICAN AMERICAN): >60 ML/MIN/1.73 M^2
GLUCOSE SERPL-MCNC: 127 MG/DL (ref 70–110)
HCT VFR BLD AUTO: 37.6 % (ref 40–54)
HGB BLD-MCNC: 11.7 G/DL (ref 14–18)
IMM GRANULOCYTES # BLD AUTO: 0.03 K/UL (ref 0–0.04)
IMM GRANULOCYTES NFR BLD AUTO: 0.7 % (ref 0–0.5)
LYMPHOCYTES # BLD AUTO: 0.6 K/UL (ref 1–4.8)
LYMPHOCYTES NFR BLD: 15.4 % (ref 18–48)
MCH RBC QN AUTO: 30.1 PG (ref 27–31)
MCHC RBC AUTO-ENTMCNC: 31.1 G/DL (ref 32–36)
MCV RBC AUTO: 97 FL (ref 82–98)
MONOCYTES # BLD AUTO: 0.7 K/UL (ref 0.3–1)
MONOCYTES NFR BLD: 15.9 % (ref 4–15)
NEUTROPHILS # BLD AUTO: 2.8 K/UL (ref 1.8–7.7)
NEUTROPHILS NFR BLD: 67.3 % (ref 38–73)
NRBC BLD-RTO: 0 /100 WBC
PLATELET # BLD AUTO: 337 K/UL (ref 150–450)
PMV BLD AUTO: 10 FL (ref 9.2–12.9)
POTASSIUM SERPL-SCNC: 4.3 MMOL/L (ref 3.5–5.1)
PROT SERPL-MCNC: 7.4 G/DL (ref 6–8.4)
RBC # BLD AUTO: 3.89 M/UL (ref 4.6–6.2)
SODIUM SERPL-SCNC: 141 MMOL/L (ref 136–145)
WBC # BLD AUTO: 4.1 K/UL (ref 3.9–12.7)

## 2022-02-07 PROCEDURE — 3077F SYST BP >= 140 MM HG: CPT | Mod: CPTII,GC,S$GLB, | Performed by: STUDENT IN AN ORGANIZED HEALTH CARE EDUCATION/TRAINING PROGRAM

## 2022-02-07 PROCEDURE — 99999 PR PBB SHADOW E&M-EST. PATIENT-LVL IV: CPT | Mod: PBBFAC,GC,, | Performed by: STUDENT IN AN ORGANIZED HEALTH CARE EDUCATION/TRAINING PROGRAM

## 2022-02-07 PROCEDURE — 80053 COMPREHEN METABOLIC PANEL: CPT | Performed by: STUDENT IN AN ORGANIZED HEALTH CARE EDUCATION/TRAINING PROGRAM

## 2022-02-07 PROCEDURE — 3077F PR MOST RECENT SYSTOLIC BLOOD PRESSURE >= 140 MM HG: ICD-10-PCS | Mod: CPTII,GC,S$GLB, | Performed by: STUDENT IN AN ORGANIZED HEALTH CARE EDUCATION/TRAINING PROGRAM

## 2022-02-07 PROCEDURE — 85025 COMPLETE CBC W/AUTO DIFF WBC: CPT | Performed by: STUDENT IN AN ORGANIZED HEALTH CARE EDUCATION/TRAINING PROGRAM

## 2022-02-07 PROCEDURE — 3079F PR MOST RECENT DIASTOLIC BLOOD PRESSURE 80-89 MM HG: ICD-10-PCS | Mod: CPTII,GC,S$GLB, | Performed by: STUDENT IN AN ORGANIZED HEALTH CARE EDUCATION/TRAINING PROGRAM

## 2022-02-07 PROCEDURE — 3008F PR BODY MASS INDEX (BMI) DOCUMENTED: ICD-10-PCS | Mod: CPTII,GC,S$GLB, | Performed by: STUDENT IN AN ORGANIZED HEALTH CARE EDUCATION/TRAINING PROGRAM

## 2022-02-07 PROCEDURE — 3079F DIAST BP 80-89 MM HG: CPT | Mod: CPTII,GC,S$GLB, | Performed by: STUDENT IN AN ORGANIZED HEALTH CARE EDUCATION/TRAINING PROGRAM

## 2022-02-07 PROCEDURE — 63600175 PHARM REV CODE 636 W HCPCS: Performed by: INTERNAL MEDICINE

## 2022-02-07 PROCEDURE — 25000003 PHARM REV CODE 250: Performed by: INTERNAL MEDICINE

## 2022-02-07 PROCEDURE — 99999 PR PBB SHADOW E&M-EST. PATIENT-LVL IV: ICD-10-PCS | Mod: PBBFAC,GC,, | Performed by: STUDENT IN AN ORGANIZED HEALTH CARE EDUCATION/TRAINING PROGRAM

## 2022-02-07 PROCEDURE — 99215 PR OFFICE/OUTPT VISIT, EST, LEVL V, 40-54 MIN: ICD-10-PCS | Mod: GC,S$GLB,, | Performed by: STUDENT IN AN ORGANIZED HEALTH CARE EDUCATION/TRAINING PROGRAM

## 2022-02-07 PROCEDURE — 99215 OFFICE O/P EST HI 40 MIN: CPT | Mod: GC,S$GLB,, | Performed by: STUDENT IN AN ORGANIZED HEALTH CARE EDUCATION/TRAINING PROGRAM

## 2022-02-07 PROCEDURE — 36415 COLL VENOUS BLD VENIPUNCTURE: CPT | Performed by: STUDENT IN AN ORGANIZED HEALTH CARE EDUCATION/TRAINING PROGRAM

## 2022-02-07 PROCEDURE — 3008F BODY MASS INDEX DOCD: CPT | Mod: CPTII,GC,S$GLB, | Performed by: STUDENT IN AN ORGANIZED HEALTH CARE EDUCATION/TRAINING PROGRAM

## 2022-02-07 RX ORDER — SODIUM CHLORIDE 0.9 % (FLUSH) 0.9 %
10 SYRINGE (ML) INJECTION
Status: DISCONTINUED | OUTPATIENT
Start: 2022-02-07 | End: 2022-02-07

## 2022-02-07 RX ORDER — SODIUM CHLORIDE 0.9 % (FLUSH) 0.9 %
10 SYRINGE (ML) INJECTION
Status: CANCELLED | OUTPATIENT
Start: 2022-02-07

## 2022-02-07 RX ORDER — HEPARIN 100 UNIT/ML
500 SYRINGE INTRAVENOUS
Status: CANCELLED | OUTPATIENT
Start: 2022-02-07

## 2022-02-07 RX ORDER — HEPARIN 100 UNIT/ML
500 SYRINGE INTRAVENOUS
Status: DISCONTINUED | OUTPATIENT
Start: 2022-02-07 | End: 2022-02-07

## 2022-02-07 NOTE — PROGRESS NOTES
HEMATOLOGY ONCOLOGY FELLOW CLINIC    HISTORY OF PRESENT ILLNESS:    Initial Consult:  Mr. Kvng Edwards is a 61 year old male with metastatic lung adenocarcinoma who presents [place] [facility] [place] [facility].     Mr. Edwards was recently admitted from 12/14/21-12/17/21. He was in the hospital visiting his significant other, who is also hospitalized, when a loud thump was heard in the hospital bathroom. The nurse opened the bathroom door and found him seizing. He was brought to the ED and MRI brain revealed multiple intracranial masses concerning for worsening metastatic disease.    Mr. Edwards was diagnosed with lung cancer in February 2021. He was treated with concurrent chemoradiation at [facility] by Dr. Charles, which was completed on 5/6/2021. He was then started on maintenance durvalumab. He then developed symptoms of right hand and leg weakness and poor coordination, which prompted brain imaging. MRI brain showed a 5.2cm mass as well as three other lesions in the left middle fontal gyrus, left precentral gyrus, left occipital lobe. CT chest was concerning for progressive disease in the bilateral lungs. He underwent surgical resection of the 5.2cm mass on 8/5/21. PET scan on 8/13/21 showed multiple new hypermetabolic pulmonary nodules in both lungs over the interim consistent with progressive metastatic disease. He then completed palliative radiation to the brain in September 2021. Palliative gemcitabine was started on 9/28/2021.     Gemcitabine was held on 11/18/21 when worsening brain metastases were noted, with the intent to focus on brain radiation. However, after the patient met with radiation oncology, gemcitabine was restarted on 12/8 with the intent to repeat brain MRI after a few weeks and to possibly offer whole brain radiation.       From Dr. Charles's last note 11/18/2021:    CARIS-- without actionable mutations. tp53 mutation in exon 7 (likely pathogenic)and exon 10 (pathogenic variant)   tmb 9  CYNTHIA  low 8%    1/11/2022: completed WBRT in 5 fractions with Dr. Dawkins  1/24/2022: cycle 1 palliative docetaxel     INTERVAL HISTORY:     Presents to follow up prior to cycle 2 of palliative docetaxel for metastatic lung adenocarcinoma. His wife sadly passed away recently in the hospital. He has some worsened right arm and leg numbness and weakness. He was started on dexamethasone 4mg daily a few days ago by Dr. Westbrook. He reports appetite is good but his weight has decreased from 121 lbs at last visit to 119 lbs today. He otherwise did well with the first cycle of docetaxel.       Past Medical History:   Diagnosis Date    Depression        Family History   Problem Relation Age of Onset    Lung cancer Mother     Pancreatic cancer Father     Cancer Sister     Brain cancer Brother     Cancer Maternal Grandmother     Cancer Maternal Grandfather     Cancer Paternal Grandmother     Cancer Paternal Grandfather        Social History     Socioeconomic History    Marital status: Single   Tobacco Use    Smoking status: Former Smoker    Smokeless tobacco: Never Used   Substance and Sexual Activity    Alcohol use: Not Currently         MEDICATIONS:     Current Outpatient Medications on File Prior to Visit   Medication Sig Dispense Refill    ALPRAZolam (XANAX) 1 MG tablet Take 1 tablet (1 mg total) by mouth 3 (three) times daily as needed for Anxiety. 90 tablet 0    butalbital-acetaminophen-caffeine -40 mg (FIORICET, ESGIC) -40 mg per tablet Take 1 tablet by mouth every 4 (four) hours as needed for Pain or Headaches. 60 tablet 0    chlorhexidine (PERIDEX) 0.12 % solution       dexAMETHasone (DECADRON) 4 MG Tab Take 1 tablet (4 mg total) by mouth once daily. 30 tablet 0    diclofenac (VOLTAREN) 75 MG EC tablet TK 1 T PO  BID   PRF  PAIN      famotidine (PEPCID) 20 MG tablet Take 20 mg by mouth 2 (two) times daily.      flu vac [id number] 36mos up,PF, (AFLURIA QD 2021-22,3YR UP,,PF,) 60 mcg (15 mcg x  4)/0.5 mL Syrg ADM 0.5ML IM UTD      gabapentin (NEURONTIN) 300 MG capsule Take 300 mg by mouth 2 (two) times daily.      ibuprofen (ADVIL,MOTRIN) 800 MG tablet Take 800 mg by mouth every 6 (six) hours as needed.      levETIRAcetam (KEPPRA) 500 MG Tab Take 1 tablet (500 mg total) by mouth 2 (two) times daily. 60 tablet 11    oxyCODONE (ROXICODONE) 5 MG immediate release tablet Take 1 tablet (5 mg total) by mouth every 4 (four) hours as needed for Pain. 120 tablet 0    oxyCODONE-acetaminophen (PERCOCET) 5-325 mg per tablet Take 1 tablet by mouth 2 (two) times daily as needed.      oxyCODONE-acetaminophen (PERCOCET) 7.5-325 mg per tablet Take 1 tablet by mouth 2 (two) times daily as needed.      pregabalin (LYRICA) 75 MG capsule TAKE ONE CAPSULE BY MOUTH TWICE DAILY FOR NEUROPATHIC PAIN      promethazine (PHENERGAN) 12.5 MG Tab Take 12.5 mg by mouth every 6 (six) hours as needed.       No current facility-administered medications on file prior to visit.       ALLERGIES: Review of patient's allergies indicates:  No Known Allergies     ROS:       Review of Systems   Constitutional: Positive for fatigue. Negative for chills and fever.   HENT:   Negative for nosebleeds and sore throat.    Respiratory: Negative for cough and shortness of breath.    Cardiovascular: Negative for chest pain and palpitations.   Gastrointestinal: Negative for abdominal pain, diarrhea, nausea and vomiting.   Genitourinary: Negative for dysuria and hematuria.    Musculoskeletal: Negative for back pain and neck pain.   Skin: Negative for rash and wound.   Neurological: Positive for extremity weakness. Negative for light-headedness.   Hematological: Negative for adenopathy. Does not bruise/bleed easily.   Psychiatric/Behavioral: Negative for depression. The patient is not nervous/anxious.        PHYSICAL EXAM:  Vitals:    02/07/22 1424   BP: (!) 148/89   Pulse: (!) 133   Resp: 18   Temp: 97.7 °F (36.5 °C)   TempSrc: Oral   SpO2: 98%  "  Weight: 54.1 kg (119 lb 4.3 oz)   Height: 5' 7" (1.702 m)   PainSc: 0-No pain       Physical Exam  Constitutional:       General: He is not in acute distress.     Appearance: He is well-developed. He is not diaphoretic.   HENT:      Head: Normocephalic and atraumatic.   Eyes:      General: No scleral icterus.     Conjunctiva/sclera: Conjunctivae normal.   Cardiovascular:      Rate and Rhythm: Normal rate and regular rhythm.      Heart sounds: Normal heart sounds. No murmur heard.  No friction rub. No gallop.    Pulmonary:      Effort: Pulmonary effort is normal. No respiratory distress.      Breath sounds: Normal breath sounds. No wheezing or rales.   Abdominal:      General: Bowel sounds are normal. There is no distension.      Palpations: Abdomen is soft.      Tenderness: There is no abdominal tenderness. There is no guarding.   Musculoskeletal:         General: Normal range of motion.      Cervical back: Normal range of motion and neck supple.      Comments: RUE: 4/5 strength. Remainder of extremities 5/5 strength   Lymphadenopathy:      Cervical: No cervical adenopathy.   Skin:     General: Skin is warm and dry.      Findings: No rash.   Neurological:      General: No focal deficit present.      Mental Status: He is alert and oriented to person, place, and time.   Psychiatric:         Mood and Affect: Mood normal.         Behavior: Behavior normal.         LAB:   Results for orders placed or performed in visit on 02/07/22   CBC Auto Differential   Result Value Ref Range    WBC 4.10 3.90 - 12.70 K/uL    RBC 3.89 (L) 4.60 - 6.20 M/uL    Hemoglobin 11.7 (L) 14.0 - 18.0 g/dL    Hematocrit 37.6 (L) 40.0 - 54.0 %    MCV 97 82 - 98 fL    MCH 30.1 27.0 - 31.0 pg    MCHC 31.1 (L) 32.0 - 36.0 g/dL    RDW 15.5 (H) 11.5 - 14.5 %    Platelets 337 150 - 450 K/uL    MPV 10.0 9.2 - 12.9 fL    Immature Granulocytes 0.7 (H) 0.0 - 0.5 %    Gran # (ANC) 2.8 1.8 - 7.7 K/uL    Immature Grans (Abs) 0.03 0.00 - 0.04 K/uL    Lymph # " 0.6 (L) 1.0 - 4.8 K/uL    Mono # 0.7 0.3 - 1.0 K/uL    Eos # 0.0 0.0 - 0.5 K/uL    Baso # 0.02 0.00 - 0.20 K/uL    nRBC 0 0 /100 WBC    Gran % 67.3 38.0 - 73.0 %    Lymph % 15.4 (L) 18.0 - 48.0 %    Mono % 15.9 (H) 4.0 - 15.0 %    Eosinophil % 0.2 0.0 - 8.0 %    Basophil % 0.5 0.0 - 1.9 %    Differential Method Automated    Comprehensive Metabolic Panel   Result Value Ref Range    Sodium 141 136 - 145 mmol/L    Potassium 4.3 3.5 - 5.1 mmol/L    Chloride 105 95 - 110 mmol/L    CO2 28 23 - 29 mmol/L    Glucose 127 (H) 70 - 110 mg/dL    BUN 9 6 - 20 mg/dL    Creatinine 0.7 0.5 - 1.4 mg/dL    Calcium 10.5 8.7 - 10.5 mg/dL    Total Protein 7.4 6.0 - 8.4 g/dL    Albumin 3.5 3.5 - 5.2 g/dL    Total Bilirubin 0.2 0.1 - 1.0 mg/dL    Alkaline Phosphatase 88 55 - 135 U/L    AST 14 10 - 40 U/L    ALT 12 10 - 44 U/L    Anion Gap 8 8 - 16 mmol/L    eGFR if African American >60.0 >60 mL/min/1.73 m^2    eGFR if non African American >60.0 >60 mL/min/1.73 m^2     MRI Brain 11/15/21:   [place] [facility] [place]     CLINICAL INDICATION:  Brain/CNS neoplasm, surveillance. Metastatic lung cancer. Follow-up.     TECHNIQUE: Pre-contrast and postcontrast acquisitions of the brain were obtained. Dynamic susceptibility contrast (DSC) perfusion-w images were obtained. Intravenous contrast was administered for the performance of this exam.     COMPARISON: MRI brain 8/24/2021, 8/6/2021, 8/4/2021, 3/5/2021     FINDINGS:   TUMOR:   Location:   *   Interval expected evolution of immediate postsurgical changes in the right cerebellum at site of prior mass resection with interval progression of rim-enhancement at the surgical site measuring in total 3.8 x 2.1 cm (image 9, series 42). Residual hyperintense T2 blood products within the posterior aspect of the resection cavity. Findings represent a combination of postsurgical change, blood products, and residual tumor. The resection cavity alone measured 2.6 x 2.2 cm on 8/24/2021 with  suspected residual tumor on current exam along the lateral aspect of the resection cavity (image 9, series 42) measuring 1.5 x 1.6 cm (image 70, series 46). Overall interval improved mass effect within the posterior fossa with no herniation or midline shift.     *   Interval progression of rim-enhancing lesion inseparable from the left occipital horn measuring 1.9 x 1 cm (image 11, series 42), previously measured 1.1 x 0.5 cm on 8/24/2021. Associated worsening hyperintense T2/FLAIR signal within the left periatrial white matter.   *   Interval progression of rim-enhancing lesion within the left middle frontal gyrus measuring 2.4 x 2.5 cm (image 21, series 42), previously measured 1.2 x 1.2 cm on 8/24/2021. Associated worsening hyperintense T2/FLAIR signal throughout the left superior and middle frontal gyri.   *   Interval progression of rim-enhancing lesion within the left precentral gyrus measuring 1.8 x 1.5 cm (image 21, series 42), previously measured 0.7 x 0.6 cm on 8/24/2021. Associated worsening hyperintense T2/FLAIR signal within the posterior left frontal lobe.   *   Interval progression of rim-enhancing lesion in the right precentral gyrus measuring 1.6 x 1.4 cm (image 21, series 42), previously measured 0.4 x 0.3 cm on 8/24/2021. Associated worsening hyperintense T2/FLAIR signal in the right precentral gyrus.   *   New extra-axial enhancing lesion along the undersurface of the left tentorial leaflet with wide dural interface measures 1.2 x 0.7 x 1.9 cm (image 15, series 33; image 9, series 42).   *   New extra-axial lesion along the lateral right cerebellar convexity with wide dural interface measures 0.9 x 0.6 cm (image 62, series 46).     FLAIR: Progression of multifocal nonenhancing FLAIR signal abnormality within the left frontal lobe, right frontal lobe, and periatrial white matter consistent with worsened vasogenic edema surrounding brain metastases. Mild mass effect in the left frontal lobe without  mass effect or herniation.     Diffusion: No diffusion abnormality to suggest hypercellular tumor.     Perfusion: Abnormal hyperperfusion is not well characterized given thin rim enhancement.     Other: Interval development of susceptibility artifact within enlarging, rim-enhancing lesions in the frontal lobes consistent with intralesional hemorrhage. No herniation.       ADDITIONAL FINDINGS:   Parenchyma: No infarction on DWI. FLAIR signal changes and mass effect in the left frontal lobe as detailed above.   Extra-axial Collection:  Status post suboccipital craniectomy with extracranial fluid collection in the suboccipital soft tissues measuring 5.9 x 5.5 x 2 cm (image 11, series 5; image 4, series 6) consistent with pseudomeningocele formation.   Ventricular System: No hydrocephalus.       PROBLEMS ASSESSED THIS VISIT:    1. Primary adenocarcinoma of right lung    2. Secondary malignant neoplasm of brain        ASSESSMENT AND PLAN    Metastatic Lung Adenocarcinoma  Lung adenocarcinoma with no targetable mutations. 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   Guardant 360 sent; no actionable mutations and MSI stable    - proceed with cycle 2 palliative docetaxel today. Patient has had extensive discussions with our service as well as palliative care and wishes to proceed with chemotherapy  - instructed to increase dexamethasone to 4mg bid. If this helps his symptoms, I can refill when his current prescription runs out  - following with Dr. Westbrook with palliative care  - referred to psych onc, no appointment made yet  - completed WBRT with Dr. Dawkins    Seizure due to brain metastases  - continue keppra    Follow up:   - CBC, CMP, and visit with me on 2/28. Chair for cycle 3 docetaxel on 3/7    Discussed with Dr. Cammy Yu DO  PGY-IV  Hematology/Oncology Fellow

## 2022-02-07 NOTE — PLAN OF CARE
Pt ambulatory to clinic today with sister in law for Taxotere infusion. Denies any sig complaints. Port accessed without difficulty. Good blood return. Treatment is being held today due to not being due till next Monday. Dr. Chawla aware. Pt will be rescheduled to then. Pt and family aware. Agreeable. Port deaccessed after flushing. Ambulatory from clinic in [facility].

## 2022-02-09 ENCOUNTER — TELEPHONE (OUTPATIENT)
Dept: HEMATOLOGY/ONCOLOGY | Facility: CLINIC | Age: 62
End: 2022-02-09
Payer: MEDICARE

## 2022-02-09 NOTE — TELEPHONE ENCOUNTER
"Call back received from Mr. Edwards's son. He reports that pt was brought to chemo on Monday 2/7/21, port was flushed and chemo was held because he "was not due till the next Monday"  Chart reviewed, son's report confirmed. Noted appt scheduled for 2/28/22.  Pt is due on Monday 2/14/22   Routing to schedulers to change appt.  "

## 2022-02-09 NOTE — TELEPHONE ENCOUNTER
Returned pt call. No answer left VM.              ----- Message from Zohreh Leigh sent at 2/8/2022  4:45 PM CST -----  Type:  Patient Returning Call    Who Called:  Doug  Who Left Message for Patient: pt  Does the patient know what this is regarding?: pt need a call apt the appt is to for out   Would the patient rather a call back or a response via [facility]?  Call   Best Call Back Number:[phone]  Additional Information: call

## 2022-02-11 ENCOUNTER — TELEPHONE (OUTPATIENT)
Dept: PALLIATIVE MEDICINE | Facility: CLINIC | Age: 62
End: 2022-02-11
Payer: MEDICARE

## 2022-02-14 ENCOUNTER — OFFICE VISIT (OUTPATIENT)
Dept: PALLIATIVE MEDICINE | Facility: CLINIC | Age: 62
End: 2022-02-14
Payer: MEDICARE

## 2022-02-14 ENCOUNTER — TELEPHONE (OUTPATIENT)
Dept: HEMATOLOGY/ONCOLOGY | Facility: CLINIC | Age: 62
End: 2022-02-14
Payer: MEDICARE

## 2022-02-14 ENCOUNTER — LAB VISIT (OUTPATIENT)
Dept: LAB | Facility: HOSPITAL | Age: 62
End: 2022-02-14
Payer: MEDICARE

## 2022-02-14 ENCOUNTER — INFUSION (OUTPATIENT)
Dept: INFUSION THERAPY | Facility: HOSPITAL | Age: 62
End: 2022-02-14
Payer: MEDICARE

## 2022-02-14 ENCOUNTER — DOCUMENTATION ONLY (OUTPATIENT)
Dept: ONCOLOGY | Facility: HOSPITAL | Age: 62
End: 2022-02-14
Payer: MEDICARE

## 2022-02-14 VITALS — RESPIRATION RATE: 18 BRPM | DIASTOLIC BLOOD PRESSURE: 80 MMHG | HEART RATE: 104 BPM | SYSTOLIC BLOOD PRESSURE: 138 MMHG

## 2022-02-14 VITALS — SYSTOLIC BLOOD PRESSURE: 144 MMHG | HEART RATE: 130 BPM | DIASTOLIC BLOOD PRESSURE: 89 MMHG

## 2022-02-14 DIAGNOSIS — Z51.5 PALLIATIVE CARE ENCOUNTER: ICD-10-CM

## 2022-02-14 DIAGNOSIS — C34.91 PRIMARY ADENOCARCINOMA OF RIGHT LUNG: ICD-10-CM

## 2022-02-14 DIAGNOSIS — C34.90 PRIMARY ADENOCARCINOMA OF LUNG, UNSPECIFIED LATERALITY: Primary | ICD-10-CM

## 2022-02-14 DIAGNOSIS — C79.31 SECONDARY MALIGNANT NEOPLASM OF BRAIN: ICD-10-CM

## 2022-02-14 DIAGNOSIS — C34.91 PRIMARY ADENOCARCINOMA OF RIGHT LUNG: Primary | ICD-10-CM

## 2022-02-14 DIAGNOSIS — Z71.89 ACP (ADVANCE CARE PLANNING): ICD-10-CM

## 2022-02-14 LAB
ALBUMIN SERPL BCP-MCNC: 3.6 G/DL (ref 3.5–5.2)
ALP SERPL-CCNC: 82 U/L (ref 55–135)
ALT SERPL W/O P-5'-P-CCNC: 11 U/L (ref 10–44)
ANION GAP SERPL CALC-SCNC: 11 MMOL/L (ref 8–16)
AST SERPL-CCNC: 15 U/L (ref 10–40)
BASOPHILS # BLD AUTO: 0.04 K/UL (ref 0–0.2)
BASOPHILS NFR BLD: 0.3 % (ref 0–1.9)
BILIRUB SERPL-MCNC: 0.2 MG/DL (ref 0.1–1)
BUN SERPL-MCNC: 12 MG/DL (ref 6–20)
CALCIUM SERPL-MCNC: 12 MG/DL (ref 8.7–10.5)
CHLORIDE SERPL-SCNC: 102 MMOL/L (ref 95–110)
CO2 SERPL-SCNC: 26 MMOL/L (ref 23–29)
CREAT SERPL-MCNC: 0.7 MG/DL (ref 0.5–1.4)
DIFFERENTIAL METHOD: ABNORMAL
EOSINOPHIL # BLD AUTO: 0 K/UL (ref 0–0.5)
EOSINOPHIL NFR BLD: 0.1 % (ref 0–8)
ERYTHROCYTE [DISTWIDTH] IN BLOOD BY AUTOMATED COUNT: 15.6 % (ref 11.5–14.5)
EST. GFR  (AFRICAN AMERICAN): >60 ML/MIN/1.73 M^2
EST. GFR  (NON AFRICAN AMERICAN): >60 ML/MIN/1.73 M^2
GLUCOSE SERPL-MCNC: 110 MG/DL (ref 70–110)
HCT VFR BLD AUTO: 39.1 % (ref 40–54)
HGB BLD-MCNC: 12.2 G/DL (ref 14–18)
IMM GRANULOCYTES # BLD AUTO: 0.08 K/UL (ref 0–0.04)
IMM GRANULOCYTES NFR BLD AUTO: 0.6 % (ref 0–0.5)
LYMPHOCYTES # BLD AUTO: 1 K/UL (ref 1–4.8)
LYMPHOCYTES NFR BLD: 7.7 % (ref 18–48)
MCH RBC QN AUTO: 30.3 PG (ref 27–31)
MCHC RBC AUTO-ENTMCNC: 31.2 G/DL (ref 32–36)
MCV RBC AUTO: 97 FL (ref 82–98)
MONOCYTES # BLD AUTO: 1.1 K/UL (ref 0.3–1)
MONOCYTES NFR BLD: 8.1 % (ref 4–15)
NEUTROPHILS # BLD AUTO: 11.1 K/UL (ref 1.8–7.7)
NEUTROPHILS NFR BLD: 83.2 % (ref 38–73)
NRBC BLD-RTO: 0 /100 WBC
PLATELET # BLD AUTO: 343 K/UL (ref 150–450)
PMV BLD AUTO: 9.9 FL (ref 9.2–12.9)
POTASSIUM SERPL-SCNC: 3.4 MMOL/L (ref 3.5–5.1)
PROT SERPL-MCNC: 7.3 G/DL (ref 6–8.4)
RBC # BLD AUTO: 4.03 M/UL (ref 4.6–6.2)
SODIUM SERPL-SCNC: 139 MMOL/L (ref 136–145)
WBC # BLD AUTO: 13.28 K/UL (ref 3.9–12.7)

## 2022-02-14 PROCEDURE — 85025 COMPLETE CBC W/AUTO DIFF WBC: CPT | Performed by: STUDENT IN AN ORGANIZED HEALTH CARE EDUCATION/TRAINING PROGRAM

## 2022-02-14 PROCEDURE — 96367 TX/PROPH/DG ADDL SEQ IV INF: CPT

## 2022-02-14 PROCEDURE — 36415 COLL VENOUS BLD VENIPUNCTURE: CPT | Performed by: STUDENT IN AN ORGANIZED HEALTH CARE EDUCATION/TRAINING PROGRAM

## 2022-02-14 PROCEDURE — 63600175 PHARM REV CODE 636 W HCPCS: Performed by: INTERNAL MEDICINE

## 2022-02-14 PROCEDURE — 96413 CHEMO IV INFUSION 1 HR: CPT

## 2022-02-14 PROCEDURE — 80053 COMPREHEN METABOLIC PANEL: CPT | Performed by: STUDENT IN AN ORGANIZED HEALTH CARE EDUCATION/TRAINING PROGRAM

## 2022-02-14 PROCEDURE — 99999 PR PBB SHADOW E&M-EST. PATIENT-LVL II: CPT | Mod: PBBFAC,,, | Performed by: EMERGENCY MEDICINE

## 2022-02-14 PROCEDURE — 25000003 PHARM REV CODE 250: Performed by: INTERNAL MEDICINE

## 2022-02-14 RX ORDER — SODIUM CHLORIDE 0.9 % (FLUSH) 0.9 %
10 SYRINGE (ML) INJECTION
Status: CANCELLED | OUTPATIENT
Start: 2022-02-14

## 2022-02-14 RX ORDER — HEPARIN 100 UNIT/ML
500 SYRINGE INTRAVENOUS
Status: DISCONTINUED | OUTPATIENT
Start: 2022-02-14 | End: 2022-02-14 | Stop reason: HOSPADM

## 2022-02-14 RX ORDER — HEPARIN 100 UNIT/ML
500 SYRINGE INTRAVENOUS
Status: CANCELLED | OUTPATIENT
Start: 2022-02-14

## 2022-02-14 RX ORDER — SODIUM CHLORIDE 0.9 % (FLUSH) 0.9 %
10 SYRINGE (ML) INJECTION
Status: DISCONTINUED | OUTPATIENT
Start: 2022-02-14 | End: 2022-02-14 | Stop reason: HOSPADM

## 2022-02-14 RX ADMIN — DOCETAXEL ANHYDROUS 120 MG: 10 INJECTION, SOLUTION INTRAVENOUS at 02:02

## 2022-02-14 RX ADMIN — SODIUM CHLORIDE: 0.9 INJECTION, SOLUTION INTRAVENOUS at 01:02

## 2022-02-14 RX ADMIN — DEXAMETHASONE SODIUM PHOSPHATE 20 MG: 4 INJECTION, SOLUTION INTRA-ARTICULAR; INTRALESIONAL; INTRAMUSCULAR; INTRAVENOUS; SOFT TISSUE at 01:02

## 2022-02-14 NOTE — PROGRESS NOTES
Consult Note  Palliative Care      Consult Requested By: Dr. 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  -     butalbital-acetaminophen-caffeine -40 mg (FIORICET, ESGIC) -40 mg per tablet; Take 1 tablet by mouth every 4 (four) hours as needed for Pain or Headaches.  -     oxyCODONE (ROXICODONE) 5 MG immediate release tablet; Take 1 tablet (5 mg total) by mouth every 4 (four) hours as needed for Pain.  -     ALPRAZolam (XANAX) 1 MG tablet; Take 1 tablet (1 mg total) by mouth 3 (three) times daily as needed for Anxiety.  - Planning for whole brain radiation followed by exploring systemic chemotherapy options  - Continue home PT    Understanding of illness: excellent; pt with excellent prognostic awareness; he knows he may only have a few months at the most with progression of disease      ACP (advance care planning)  Palliative care encounter    Prognosis: poor; likely weeks to months-discussed with the pt and significant other    Advance Care Planning   Goals of care: pt hoping to achieve the best QOL of possible with current tx plan of WBXRT and chemo if possible.  Hopes top meaningfully extend life, however, he is clear that if continued or proposed therapy is unlikely to provide meaningful improvements to his QOL, he would want to focus on a comfort focused care plan, maximizing his time in home setting.    He voiced that his brother Raffi Edwards is also aware of his intentions and hopes.  Desires his brother to act as HCPOA should he lose the capacity  to make medical decisions       Follow up: 1-2 months    SUBJECTIVE:     History of Present Illness:  Patient is a 61 y.o. 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  He experienced a seizure and loss of consciousness while visiting his significant other in the hospital who is being treated for pancreatic cancer herself.  History significant for lung cancer diagnosed in February 2021 and treated at [facility]. I do not have records available, but he believes he underwent resection of a brain metastasis several months ago followed by radiation to the head. MRI Brain 12/14/21 demonstrated multiple cystic rim-enhancing lesions c/w metastases as well as multifocal areas of leptomeningeal disease. He was seen by Dr. Estiven Dawkins in XRT and planning on undergonig whole brain radiation after discussing risks.  Plan is for WBRT 20 Gy in 5 fractions and in consideration of systemic therapy with Medical Oncology.  He presents today with chief complaint being persistent and intensifying headaches.  No additional seizures since being home, no falls.  Poor appetite, poor sleep do to pain, and increased worry/anxiety over what the future holds.  No fevers.  Mild weakness on right side has been stable.  Significant other, Yumiko Stanley also with the pt today.    LA  reviewed and summarized 1/3:  12/29/2021 Oxycodone-Acetaminophn 7.5-325 60.00 30 Lizzy Abdirahman   12/17/2021 Alprazolam 1 Mg Tablet 60.00 60 Sco Aco   12/10/2021 Hydrocodone-Acetamin  Mg 60.00 15 Bernie Ell         Previous experience or exposure to a serious illness:  yes    Past Medical History:   Diagnosis Date    Depression      Past Surgical History:   Procedure Laterality Date    TONSILLECTOMY      TUMOR REMOVAL       Family History   Problem Relation Age of Onset    Lung cancer Mother     Pancreatic cancer Father     Cancer Sister     Brain cancer Brother     Cancer Maternal Grandmother     Cancer  Maternal Grandfather     Cancer Paternal Grandmother     Cancer Paternal Grandfather      Review of patient's allergies indicates:  No Known Allergies    Medications:    Current Outpatient Medications:     ALPRAZolam (XANAX) 1 MG tablet, Take 1 tablet (1 mg total) by mouth 3 (three) times daily as needed for Anxiety., Disp: 90 tablet, Rfl: 0    butalbital-acetaminophen-caffeine -40 mg (FIORICET, ESGIC) -40 mg per tablet, Take 1 tablet by mouth every 4 (four) hours as needed for Pain or Headaches., Disp: 60 tablet, Rfl: 0    chlorhexidine (PERIDEX) 0.12 % solution, , Disp: , Rfl:     dexAMETHasone (DECADRON) 4 MG Tab, Take 1 tablet (4 mg total) by mouth once daily., Disp: 30 tablet, Rfl: 0    diclofenac (VOLTAREN) 75 MG EC tablet, TK 1 T PO  BID   PRF  PAIN, Disp: , Rfl:     famotidine (PEPCID) 20 MG tablet, Take 20 mg by mouth 2 (two) times daily., Disp: , Rfl:     flu vac [id number] 36mos up,PF, (AFLURIA QD 2021-22,3YR UP,,PF,) 60 mcg (15 mcg x 4)/0.5 mL Syrg, ADM 0.5ML IM UTD, Disp: , Rfl:     gabapentin (NEURONTIN) 300 MG capsule, Take 300 mg by mouth 2 (two) times daily., Disp: , Rfl:     ibuprofen (ADVIL,MOTRIN) 800 MG tablet, Take 800 mg by mouth every 6 (six) hours as needed., Disp: , Rfl:     levETIRAcetam (KEPPRA) 500 MG Tab, Take 1 tablet (500 mg total) by mouth 2 (two) times daily., Disp: 60 tablet, Rfl: 11    oxyCODONE (ROXICODONE) 5 MG immediate release tablet, Take 1 tablet (5 mg total) by mouth every 4 (four) hours as needed for Pain., Disp: 120 tablet, Rfl: 0    oxyCODONE-acetaminophen (PERCOCET) 5-325 mg per tablet, Take 1 tablet by mouth 2 (two) times daily as needed., Disp: , Rfl:     oxyCODONE-acetaminophen (PERCOCET) 7.5-325 mg per tablet, Take 1 tablet by mouth 2 (two) times daily as needed., Disp: , Rfl:     pregabalin (LYRICA) 75 MG capsule, TAKE ONE CAPSULE BY MOUTH TWICE DAILY FOR NEUROPATHIC PAIN, Disp: , Rfl:     promethazine (PHENERGAN) 12.5 MG Tab, Take 12.5  mg by mouth every 6 (six) hours as needed., Disp: , Rfl:     OBJECTIVE:       ROS:  Review of Systems   All other systems reviewed and are negative.    Constitutional: Negative for fever and weight loss.   HENT: Negative for ear pain and sore throat.    Eyes: Negative for blurred vision and double vision.   Respiratory: Negative for cough, hemoptysis and shortness of breath.    Cardiovascular: Negative for chest pain and leg swelling.   Gastrointestinal: Negative for abdominal pain, constipation, diarrhea, heartburn and nausea.   Genitourinary: Negative for dysuria and hematuria.   Musculoskeletal: Positive for back pain. Negative for falls and joint pain.   Neurological: Positive for tingling, focal weakness and headaches. Negative for speech change and seizures.   Psychiatric/Behavioral: Negative for depression. The patient is nervous/anxious.    Review of Symptoms    Symptom Assessment (ESAS 0-10 Scale)  Pain:  10  Dyspnea:  8  Anxiety:  10  Nausea:  0  Depression:  0  Anorexia:  0  Fatigue:  8  Insomnia:  10  Restlessness:  0  Agitation:  0         Pain Assessment:  Location(s): head    Head       Location: frontal        Quality: aching        Quantity: 10/10 in intensity        Chronicity: Onset 1 month(s) ago, gradually worsening        Aggravating Factors: activity        Alleviating Factors: NSAIDs and acetaminophen        Associated Symptoms: none (no additional seizures)    ECOG Performance Status stGstrstastdstest:st st1st Living Arrangement: lives with significant other and fiancee     Psychosocial/Cultural: Sig other Lolly Stanley also has advanced cancer (pancreatic)    Would want his brother to be main decision maker (primary HCPOA-oral declaration)- witnessed by NANCY Garner    Spiritual:  F - Kinza and Belief:  Did not discuss today           Advance Care Planning   Advance Directives:   Living Will: No    LaPOST: No    Do Not Resuscitate Status: No    Medical Power of : Yes        Oral  Declaration: Yes   Witnesses:  Sarah Garner LCSW and Olga Albert RN   Agent's Name:  Raffi Edwards (brother) [phone]    Decision Making:  Patient answered questions              Physical Exam:  Vitals: Pulse: (!) 130 (02/14/22 0906)  BP: (!) 144/89 (02/14/22 0906)  Physical Exam  Vitals and nursing note reviewed.     Constitutional:       Appearance: Normal appearance.   HENT:      Head: Normocephalic and atraumatic.   Cardiology:  Regular rate; no murmur  Eyes:      General: No scleral icterus.     Extraocular Movements: Extraocular movements intact.   Pulmonary:      Effort: Pulmonary effort is normal. No respiratory distress.   Abdominal:      General: There is no distension.   Musculoskeletal:      Cervical back: Neck supple. No deformities  Lymphadenopathy:      Cervical: No cervical adenopathy.   Skin:     General: Skin is warm and dry.   Neurological:      General: mild right sided weakness     Mental Status: He is alert and oriented to person, place, and time.      Cranial Nerves: No cranial nerve deficit.   Psychiatric:         Mood and Affect: Mood normal.         Behavior: Behavior normal.         Judgment: Judgment normal.     Labs:  CBC:   WBC   Date Value Ref Range Status   02/07/2022 4.10 3.90 - 12.70 K/uL Final     Hemoglobin   Date Value Ref Range Status   02/07/2022 11.7 (L) 14.0 - 18.0 g/dL Final     POC Hematocrit   Date Value Ref Range Status   12/14/2021 37 36 - 54 %PCV Final     Hematocrit   Date Value Ref Range Status   02/07/2022 37.6 (L) 40.0 - 54.0 % Final     MCV   Date Value Ref Range Status   02/07/2022 97 82 - 98 fL Final     Platelets   Date Value Ref Range Status   02/07/2022 337 150 - 450 K/uL Final       LFT:   Lab Results   Component Value Date    AST 14 02/07/2022    ALKPHOS 88 02/07/2022    BILITOT 0.2 02/07/2022       Albumin:   Albumin   Date Value Ref Range Status   02/07/2022 3.5 3.5 - 5.2 g/dL Final     Protein:   Total Protein   Date Value Ref Range Status    02/07/2022 7.4 6.0 - 8.4 g/dL Final       Radiology:I have reviewed all pertinent imaging results/findings within the past 24 hours.  CT chest was concerning for progressive disease in the bilateral lungs. He underwent surgical resection of the 5.2cm mass on 8/5/21. PET scan on 8/13/21 showed multiple new hypermetabolic pulmonary nodules in both lungs over the interim consistent with progressive metastatic disease    MRI Brain 11/15/21:   EXAM: [facility] MRI BRAIN PERFUSION W WO CONTRAST     CLINICAL INDICATION:  Brain/CNS neoplasm, surveillance. Metastatic lung cancer. Follow-up.     TECHNIQUE: Pre-contrast and postcontrast acquisitions of the brain were obtained. Dynamic susceptibility contrast (DSC) perfusion-w images were obtained. Intravenous contrast was administered for the performance of this exam.     COMPARISON: MRI brain 8/24/2021, 8/6/2021, 8/4/2021, 3/5/2021     FINDINGS:   TUMOR:   Location:   *   Interval expected evolution of immediate postsurgical changes in the right cerebellum at site of prior mass resection with interval progression of rim-enhancement at the surgical site measuring in total 3.8 x 2.1 cm (image 9, series 42). Residual hyperintense T2 blood products within the posterior aspect of the resection cavity. Findings represent a combination of postsurgical change, blood products, and residual tumor. The resection cavity alone measured 2.6 x 2.2 cm on 8/24/2021 with suspected residual tumor on current exam along the lateral aspect of the resection cavity (image 9, series 42) measuring 1.5 x 1.6 cm (image 70, series 46). Overall interval improved mass effect within the posterior fossa with no herniation or midline shift.     *   Interval progression of rim-enhancing lesion inseparable from the left occipital horn measuring 1.9 x 1 cm (image 11, series 42), previously measured 1.1 x 0.5 cm on 8/24/2021. Associated worsening hyperintense T2/FLAIR signal within the left periatrial white matter.    *   Interval progression of rim-enhancing lesion within the left middle frontal gyrus measuring 2.4 x 2.5 cm (image 21, series 42), previously measured 1.2 x 1.2 cm on 8/24/2021. Associated worsening hyperintense T2/FLAIR signal throughout the left superior and middle frontal gyri.   *   Interval progression of rim-enhancing lesion within the left precentral gyrus measuring 1.8 x 1.5 cm (image 21, series 42), previously measured 0.7 x 0.6 cm on 8/24/2021. Associated worsening hyperintense T2/FLAIR signal within the posterior left frontal lobe.   *   Interval progression of rim-enhancing lesion in the right precentral gyrus measuring 1.6 x 1.4 cm (image 21, series 42), previously measured 0.4 x 0.3 cm on 8/24/2021. Associated worsening hyperintense T2/FLAIR signal in the right precentral gyrus.   *   New extra-axial enhancing lesion along the undersurface of the left tentorial leaflet with wide dural interface measures 1.2 x 0.7 x 1.9 cm (image 15, series 33; image 9, series 42).   *   New extra-axial lesion along the lateral right cerebellar convexity with wide dural interface measures 0.9 x 0.6 cm (image 62, series 46).     FLAIR: Progression of multifocal nonenhancing FLAIR signal abnormality within the left frontal lobe, right frontal lobe, and periatrial white matter consistent with worsened vasogenic edema surrounding brain metastases. Mild mass effect in the left frontal lobe without mass effect or herniation.     Diffusion: No diffusion abnormality to suggest hypercellular tumor.     Perfusion: Abnormal hyperperfusion is not well characterized given thin rim enhancement.     Other: Interval development of susceptibility artifact within enlarging, rim-enhancing lesions in the frontal lobes consistent with intralesional hemorrhage. No herniation.       ADDITIONAL FINDINGS:   Parenchyma: No infarction on DWI. FLAIR signal changes and mass effect in the left frontal lobe as detailed above.   Extra-axial  Collection:  Status post suboccipital craniectomy with extracranial fluid collection in the suboccipital soft tissues measuring 5.9 x 5.5 x 2 cm (image 11, series 5; image 4, series 6) consistent with pseudomeningocele formation.   Ventricular System: No hydrocephalus.     > 50% of 60 min visit spent in chart review, face to face discussion of goals of care,  symptom assessment, coordination of care and emotional support.      Signature: Tad Westbrook MD

## 2022-02-14 NOTE — PROGRESS NOTES
In response to in basket message from Dr. Westbrook noting pt needs Boost Sw called pt and LVM, including name, direct telephone number and reason for calling. SW will call pt again tomorrow if he does not call back prior.

## 2022-02-14 NOTE — PLAN OF CARE
1525  Pt tolerated Taxotere infusion well today, no complaints or complications,. VSS through duration of treatment. Pt aware to call provider with any questions or concerns, plan of care reviewed, aware of upcoming appts. Pt ambulatory from clinic with steady gait, no distress noted.

## 2022-02-14 NOTE — TELEPHONE ENCOUNTER
Called pt. Confirmed today's appt for lab and chemotherapy. 2 and 3pm. Pt verbalized that he will be there and expressed gratitude for the call.

## 2022-02-15 ENCOUNTER — DOCUMENTATION ONLY (OUTPATIENT)
Dept: HEMATOLOGY/ONCOLOGY | Facility: CLINIC | Age: 62
End: 2022-02-15
Payer: MEDICARE

## 2022-02-15 NOTE — PROGRESS NOTES
CANDIDA spoke with pt and explained Boost can be provided to him at no cost.  He said he only drinks Ensure but acknowledged he has not tried Boost Breeze. He will come tomorrow to  a case to try as he is having difficulty affording Ensure.  CANDIDA provided name and contact information and encouraged pt to call with any future needs.

## 2022-02-24 RX ORDER — BUTALBITAL, ACETAMINOPHEN AND CAFFEINE 50; 325; 40 MG/1; MG/1; MG/1
1 TABLET ORAL EVERY 4 HOURS PRN
Qty: 60 TABLET | Refills: 0 | Status: CANCELLED | OUTPATIENT
Start: 2022-02-24 | End: 2022-03-26

## 2022-02-24 RX ORDER — BUTALBITAL, ACETAMINOPHEN AND CAFFEINE 50; 325; 40 MG/1; MG/1; MG/1
1 TABLET ORAL EVERY 4 HOURS PRN
Qty: 60 TABLET | Refills: 0 | Status: SHIPPED | OUTPATIENT
Start: 2022-02-24 | End: 2022-03-26

## 2022-02-27 RX ORDER — ALPRAZOLAM 1 MG/1
1 TABLET ORAL 3 TIMES DAILY PRN
Qty: 90 TABLET | Refills: 0 | Status: SHIPPED | OUTPATIENT
Start: 2022-02-27

## 2022-02-28 ENCOUNTER — OFFICE VISIT (OUTPATIENT)
Dept: HEMATOLOGY/ONCOLOGY | Facility: CLINIC | Age: 62
End: 2022-02-28
Payer: MEDICARE

## 2022-02-28 VITALS
TEMPERATURE: 98 F | RESPIRATION RATE: 18 BRPM | OXYGEN SATURATION: 94 % | BODY MASS INDEX: 17.89 KG/M2 | WEIGHT: 114 LBS | HEART RATE: 127 BPM | HEIGHT: 67 IN | SYSTOLIC BLOOD PRESSURE: 128 MMHG | DIASTOLIC BLOOD PRESSURE: 86 MMHG

## 2022-02-28 DIAGNOSIS — C34.91 PRIMARY ADENOCARCINOMA OF RIGHT LUNG: Primary | ICD-10-CM

## 2022-02-28 DIAGNOSIS — C79.31 SECONDARY MALIGNANT NEOPLASM OF BRAIN: ICD-10-CM

## 2022-02-28 PROCEDURE — 99999 PR PBB SHADOW E&M-EST. PATIENT-LVL V: CPT | Mod: PBBFAC,GC,, | Performed by: STUDENT IN AN ORGANIZED HEALTH CARE EDUCATION/TRAINING PROGRAM

## 2022-02-28 PROCEDURE — 3074F SYST BP LT 130 MM HG: CPT | Mod: CPTII,GC,S$GLB, | Performed by: STUDENT IN AN ORGANIZED HEALTH CARE EDUCATION/TRAINING PROGRAM

## 2022-02-28 PROCEDURE — 3074F PR MOST RECENT SYSTOLIC BLOOD PRESSURE < 130 MM HG: ICD-10-PCS | Mod: CPTII,GC,S$GLB, | Performed by: STUDENT IN AN ORGANIZED HEALTH CARE EDUCATION/TRAINING PROGRAM

## 2022-02-28 PROCEDURE — 1160F RVW MEDS BY RX/DR IN RCRD: CPT | Mod: CPTII,GC,S$GLB, | Performed by: STUDENT IN AN ORGANIZED HEALTH CARE EDUCATION/TRAINING PROGRAM

## 2022-02-28 PROCEDURE — 3079F PR MOST RECENT DIASTOLIC BLOOD PRESSURE 80-89 MM HG: ICD-10-PCS | Mod: CPTII,GC,S$GLB, | Performed by: STUDENT IN AN ORGANIZED HEALTH CARE EDUCATION/TRAINING PROGRAM

## 2022-02-28 PROCEDURE — 1159F MED LIST DOCD IN RCRD: CPT | Mod: CPTII,GC,S$GLB, | Performed by: STUDENT IN AN ORGANIZED HEALTH CARE EDUCATION/TRAINING PROGRAM

## 2022-02-28 PROCEDURE — 99215 OFFICE O/P EST HI 40 MIN: CPT | Mod: GC,S$GLB,, | Performed by: STUDENT IN AN ORGANIZED HEALTH CARE EDUCATION/TRAINING PROGRAM

## 2022-02-28 PROCEDURE — 3079F DIAST BP 80-89 MM HG: CPT | Mod: CPTII,GC,S$GLB, | Performed by: STUDENT IN AN ORGANIZED HEALTH CARE EDUCATION/TRAINING PROGRAM

## 2022-02-28 PROCEDURE — 1160F PR REVIEW ALL MEDS BY PRESCRIBER/CLIN PHARMACIST DOCUMENTED: ICD-10-PCS | Mod: CPTII,GC,S$GLB, | Performed by: STUDENT IN AN ORGANIZED HEALTH CARE EDUCATION/TRAINING PROGRAM

## 2022-02-28 PROCEDURE — 99999 PR PBB SHADOW E&M-EST. PATIENT-LVL V: ICD-10-PCS | Mod: PBBFAC,GC,, | Performed by: STUDENT IN AN ORGANIZED HEALTH CARE EDUCATION/TRAINING PROGRAM

## 2022-02-28 PROCEDURE — 1159F PR MEDICATION LIST DOCUMENTED IN MEDICAL RECORD: ICD-10-PCS | Mod: CPTII,GC,S$GLB, | Performed by: STUDENT IN AN ORGANIZED HEALTH CARE EDUCATION/TRAINING PROGRAM

## 2022-02-28 PROCEDURE — 3008F PR BODY MASS INDEX (BMI) DOCUMENTED: ICD-10-PCS | Mod: CPTII,GC,S$GLB, | Performed by: STUDENT IN AN ORGANIZED HEALTH CARE EDUCATION/TRAINING PROGRAM

## 2022-02-28 PROCEDURE — 99215 PR OFFICE/OUTPT VISIT, EST, LEVL V, 40-54 MIN: ICD-10-PCS | Mod: GC,S$GLB,, | Performed by: STUDENT IN AN ORGANIZED HEALTH CARE EDUCATION/TRAINING PROGRAM

## 2022-02-28 PROCEDURE — 3008F BODY MASS INDEX DOCD: CPT | Mod: CPTII,GC,S$GLB, | Performed by: STUDENT IN AN ORGANIZED HEALTH CARE EDUCATION/TRAINING PROGRAM

## 2022-02-28 RX ORDER — BENZONATATE 100 MG/1
100 CAPSULE ORAL 3 TIMES DAILY PRN
Qty: 30 CAPSULE | Refills: 1 | Status: SHIPPED | OUTPATIENT
Start: 2022-02-28 | End: 2023-02-28

## 2022-02-28 NOTE — PROGRESS NOTES
HEMATOLOGY ONCOLOGY FELLOW CLINIC    HISTORY OF PRESENT ILLNESS:    Initial Consult:  Mr. Kvng Edwards is a 61 year old male with metastatic lung adenocarcinoma who presents to transfer his cancer care from [facility] to [facility].     Mr. Edwards was recently admitted from 12/14/21-12/17/21. He was in the hospital visiting his significant other, who is also hospitalized, when a loud thump was heard in the hospital bathroom. The nurse opened the bathroom door and found him seizing. He was brought to the ED and MRI brain revealed multiple intracranial masses concerning for worsening metastatic disease.    Mr. Edwards was diagnosed with lung cancer in February 2021. He was treated with concurrent chemoradiation at [facility] by Dr. Charles, which was completed on 5/6/2021. He was then started on maintenance durvalumab. He then developed symptoms of right hand and leg weakness and poor coordination, which prompted brain imaging. MRI brain showed a 5.2cm mass as well as three other lesions in the left middle fontal gyrus, left precentral gyrus, left occipital lobe. CT chest was concerning for progressive disease in the bilateral lungs. He underwent surgical resection of the 5.2cm mass on 8/5/21. PET scan on 8/13/21 showed multiple new hypermetabolic pulmonary nodules in both lungs over the interim consistent with progressive metastatic disease. He then completed palliative radiation to the brain in September 2021. Palliative gemcitabine was started on 9/28/2021.     Gemcitabine was held on 11/18/21 when worsening brain metastases were noted, with the intent to focus on brain radiation. However, after the patient met with radiation oncology, gemcitabine was restarted on 12/8 with the intent to repeat brain MRI after a few weeks and to possibly offer whole brain radiation.       From Dr. Charles's last note 11/18/2021:    CARIS-- without actionable mutations. tp53 mutation in exon 7 (likely pathogenic)and exon 10 (pathogenic variant)   tmb  9  CYNTHIA low 8%    1/11/2022: completed WBRT in 5 fractions with Dr. Dawkins  1/24/2022: cycle 1 palliative docetaxel   2/14/2022: cycle 2 palliative docetaxel  3/8/2022: cycle 3 palliative docetaxel    INTERVAL HISTORY:     Presents to follow up prior to cycle 3 of palliative docetaxel for metastatic lung adenocarcinoma. Generally unchanged since last visit, still has right-sided weakness. Eating and drinking ok, got Boost today during visit.       Past Medical History:   Diagnosis Date    Depression        Family History   Problem Relation Age of Onset    Lung cancer Mother     Pancreatic cancer Father     Cancer Sister     Brain cancer Brother     Cancer Maternal Grandmother     Cancer Maternal Grandfather     Cancer Paternal Grandmother     Cancer Paternal Grandfather        Social History     Socioeconomic History    Marital status: Single   Tobacco Use    Smoking status: Former Smoker    Smokeless tobacco: Never Used   Substance and Sexual Activity    Alcohol use: Not Currently         MEDICATIONS:     Current Outpatient Medications on File Prior to Visit   Medication Sig Dispense Refill    ALPRAZolam (XANAX) 1 MG tablet Take 1 tablet (1 mg total) by mouth 3 (three) times daily as needed for Anxiety. 90 tablet 0    butalbital-acetaminophen-caffeine -40 mg (FIORICET, ESGIC) -40 mg per tablet Take 1 tablet by mouth every 4 (four) hours as needed for Pain or Headaches. 60 tablet 0    chlorhexidine (PERIDEX) 0.12 % solution       dexAMETHasone (DECADRON) 4 MG Tab Take 1 tablet (4 mg total) by mouth once daily. 30 tablet 0    diclofenac (VOLTAREN) 75 MG EC tablet TK 1 T PO  BID   PRF  PAIN      famotidine (PEPCID) 20 MG tablet Take 20 mg by mouth 2 (two) times daily.      flu vac [id number] 36mos up,PF, (AFLURIA QD 2021-22,3YR UP,,PF,) 60 mcg (15 mcg x 4)/0.5 mL Syrg ADM 0.5ML IM UTD      gabapentin (NEURONTIN) 300 MG capsule Take 300 mg by mouth 2 (two) times daily.      ibuprofen  "(ADVIL,MOTRIN) 800 MG tablet Take 800 mg by mouth every 6 (six) hours as needed.      levETIRAcetam (KEPPRA) 500 MG Tab Take 1 tablet (500 mg total) by mouth 2 (two) times daily. 60 tablet 11    oxyCODONE (ROXICODONE) 5 MG immediate release tablet Take 1 tablet (5 mg total) by mouth every 4 (four) hours as needed for Pain. 120 tablet 0    oxyCODONE-acetaminophen (PERCOCET) 5-325 mg per tablet Take 1 tablet by mouth 2 (two) times daily as needed.      oxyCODONE-acetaminophen (PERCOCET) 7.5-325 mg per tablet Take 1 tablet by mouth 2 (two) times daily as needed.      pregabalin (LYRICA) 75 MG capsule TAKE ONE CAPSULE BY MOUTH TWICE DAILY FOR NEUROPATHIC PAIN      promethazine (PHENERGAN) 12.5 MG Tab Take 12.5 mg by mouth every 6 (six) hours as needed.       No current facility-administered medications on file prior to visit.       ALLERGIES: Review of patient's allergies indicates:  No Known Allergies     ROS:       Review of Systems   Constitutional: Positive for fatigue. Negative for chills and fever.   HENT:   Negative for nosebleeds and sore throat.    Respiratory: Negative for cough and shortness of breath.    Cardiovascular: Negative for chest pain and palpitations.   Gastrointestinal: Negative for abdominal pain, diarrhea, nausea and vomiting.   Genitourinary: Negative for dysuria and hematuria.    Musculoskeletal: Negative for back pain and neck pain.   Skin: Negative for rash and wound.   Neurological: Positive for extremity weakness. Negative for light-headedness.   Hematological: Negative for adenopathy. Does not bruise/bleed easily.   Psychiatric/Behavioral: Negative for depression. The patient is not nervous/anxious.        PHYSICAL EXAM:  Vitals:    02/28/22 1437   BP: 128/86   Pulse: (!) 127   Resp: 18   Temp: 97.6 °F (36.4 °C)   TempSrc: Oral   SpO2: (!) 94%   Weight: 51.7 kg (113 lb 15.7 oz)   Height: 5' 7" (1.702 m)   PainSc: 0-No pain       Physical Exam  Constitutional:       General: He is " not in acute distress.     Appearance: He is well-developed. He is not diaphoretic.   HENT:      Head: Normocephalic and atraumatic.   Eyes:      General: No scleral icterus.     Conjunctiva/sclera: Conjunctivae normal.   Cardiovascular:      Rate and Rhythm: Normal rate and regular rhythm.      Heart sounds: Normal heart sounds. No murmur heard.    No friction rub. No gallop.   Pulmonary:      Effort: Pulmonary effort is normal. No respiratory distress.      Breath sounds: Normal breath sounds. No wheezing or rales.   Abdominal:      General: Bowel sounds are normal. There is no distension.      Palpations: Abdomen is soft.      Tenderness: There is no abdominal tenderness. There is no guarding.   Musculoskeletal:         General: Normal range of motion.      Cervical back: Normal range of motion and neck supple.      Comments: RUE: 4/5 strength. Remainder of extremities 5/5 strength   Lymphadenopathy:      Cervical: No cervical adenopathy.   Skin:     General: Skin is warm and dry.      Findings: No rash.   Neurological:      General: No focal deficit present.      Mental Status: He is alert and oriented to person, place, and time.   Psychiatric:         Mood and Affect: Mood normal.         Behavior: Behavior normal.         LAB:   Results for orders placed or performed in visit on 02/14/22   CBC Auto Differential   Result Value Ref Range    WBC 13.28 (H) 3.90 - 12.70 K/uL    RBC 4.03 (L) 4.60 - 6.20 M/uL    Hemoglobin 12.2 (L) 14.0 - 18.0 g/dL    Hematocrit 39.1 (L) 40.0 - 54.0 %    MCV 97 82 - 98 fL    MCH 30.3 27.0 - 31.0 pg    MCHC 31.2 (L) 32.0 - 36.0 g/dL    RDW 15.6 (H) 11.5 - 14.5 %    Platelets 343 150 - 450 K/uL    MPV 9.9 9.2 - 12.9 fL    Immature Granulocytes 0.6 (H) 0.0 - 0.5 %    Gran # (ANC) 11.1 (H) 1.8 - 7.7 K/uL    Immature Grans (Abs) 0.08 (H) 0.00 - 0.04 K/uL    Lymph # 1.0 1.0 - 4.8 K/uL    Mono # 1.1 (H) 0.3 - 1.0 K/uL    Eos # 0.0 0.0 - 0.5 K/uL    Baso # 0.04 0.00 - 0.20 K/uL    nRBC 0 0  /100 WBC    Gran % 83.2 (H) 38.0 - 73.0 %    Lymph % 7.7 (L) 18.0 - 48.0 %    Mono % 8.1 4.0 - 15.0 %    Eosinophil % 0.1 0.0 - 8.0 %    Basophil % 0.3 0.0 - 1.9 %    Differential Method Automated    Comprehensive Metabolic Panel   Result Value Ref Range    Sodium 139 136 - 145 mmol/L    Potassium 3.4 (L) 3.5 - 5.1 mmol/L    Chloride 102 95 - 110 mmol/L    CO2 26 23 - 29 mmol/L    Glucose 110 70 - 110 mg/dL    BUN 12 6 - 20 mg/dL    Creatinine 0.7 0.5 - 1.4 mg/dL    Calcium 12.0 (H) 8.7 - 10.5 mg/dL    Total Protein 7.3 6.0 - 8.4 g/dL    Albumin 3.6 3.5 - 5.2 g/dL    Total Bilirubin 0.2 0.1 - 1.0 mg/dL    Alkaline Phosphatase 82 55 - 135 U/L    AST 15 10 - 40 U/L    ALT 11 10 - 44 U/L    Anion Gap 11 8 - 16 mmol/L    eGFR if African American >60.0 >60 mL/min/1.73 m^2    eGFR if non African American >60.0 >60 mL/min/1.73 m^2     MRI Brain 11/15/21:   EXAM: [facility] MRI BRAIN PERFUSION W WO CONTRAST     CLINICAL INDICATION:  Brain/CNS neoplasm, surveillance. Metastatic lung cancer. Follow-up.     TECHNIQUE: Pre-contrast and postcontrast acquisitions of the brain were obtained. Dynamic susceptibility contrast (DSC) perfusion-w images were obtained. Intravenous contrast was administered for the performance of this exam.     COMPARISON: MRI brain 8/24/2021, 8/6/2021, 8/4/2021, 3/5/2021     FINDINGS:   TUMOR:   Location:   *   Interval expected evolution of immediate postsurgical changes in the right cerebellum at site of prior mass resection with interval progression of rim-enhancement at the surgical site measuring in total 3.8 x 2.1 cm (image 9, series 42). Residual hyperintense T2 blood products within the posterior aspect of the resection cavity. Findings represent a combination of postsurgical change, blood products, and residual tumor. The resection cavity alone measured 2.6 x 2.2 cm on 8/24/2021 with suspected residual tumor on current exam along the lateral aspect of the resection cavity (image 9, series 42)  measuring 1.5 x 1.6 cm (image 70, series 46). Overall interval improved mass effect within the posterior fossa with no herniation or midline shift.     *   Interval progression of rim-enhancing lesion inseparable from the left occipital horn measuring 1.9 x 1 cm (image 11, series 42), previously measured 1.1 x 0.5 cm on 8/24/2021. Associated worsening hyperintense T2/FLAIR signal within the left periatrial white matter.   *   Interval progression of rim-enhancing lesion within the left middle frontal gyrus measuring 2.4 x 2.5 cm (image 21, series 42), previously measured 1.2 x 1.2 cm on 8/24/2021. Associated worsening hyperintense T2/FLAIR signal throughout the left superior and middle frontal gyri.   *   Interval progression of rim-enhancing lesion within the left precentral gyrus measuring 1.8 x 1.5 cm (image 21, series 42), previously measured 0.7 x 0.6 cm on 8/24/2021. Associated worsening hyperintense T2/FLAIR signal within the posterior left frontal lobe.   *   Interval progression of rim-enhancing lesion in the right precentral gyrus measuring 1.6 x 1.4 cm (image 21, series 42), previously measured 0.4 x 0.3 cm on 8/24/2021. Associated worsening hyperintense T2/FLAIR signal in the right precentral gyrus.   *   New extra-axial enhancing lesion along the undersurface of the left tentorial leaflet with wide dural interface measures 1.2 x 0.7 x 1.9 cm (image 15, series 33; image 9, series 42).   *   New extra-axial lesion along the lateral right cerebellar convexity with wide dural interface measures 0.9 x 0.6 cm (image 62, series 46).     FLAIR: Progression of multifocal nonenhancing FLAIR signal abnormality within the left frontal lobe, right frontal lobe, and periatrial white matter consistent with worsened vasogenic edema surrounding brain metastases. Mild mass effect in the left frontal lobe without mass effect or herniation.     Diffusion: No diffusion abnormality to suggest hypercellular tumor.      Perfusion: Abnormal hyperperfusion is not well characterized given thin rim enhancement.     Other: Interval development of susceptibility artifact within enlarging, rim-enhancing lesions in the frontal lobes consistent with intralesional hemorrhage. No herniation.       ADDITIONAL FINDINGS:   Parenchyma: No infarction on DWI. FLAIR signal changes and mass effect in the left frontal lobe as detailed above.   Extra-axial Collection:  Status post suboccipital craniectomy with extracranial fluid collection in the suboccipital soft tissues measuring 5.9 x 5.5 x 2 cm (image 11, series 5; image 4, series 6) consistent with pseudomeningocele formation.   Ventricular System: No hydrocephalus.       PROBLEMS ASSESSED THIS VISIT:    1. Primary adenocarcinoma of right lung    2. Secondary malignant neoplasm of brain        ASSESSMENT AND PLAN    Metastatic Lung Adenocarcinoma  Lung adenocarcinoma with no targetable mutations. 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   Guardant 360 sent; no actionable mutations and MSI stable    - proceed with cycle 3 palliative docetaxel on 3/8 (seen off cycle since I will be out of town next week). Patient has had extensive discussions with our service as well as palliative care and wishes to proceed with chemotherapy  - he is scheduled for restaging MRI on 4/28, will plan to obtain restaging imaging around that time  - following with Dr. Westbrook with palliative care  - referred to psych onc  - completed WBRT with Dr. Dawkins    Seizure due to brain metastases  - continue keppra    Follow up:   - CBC, CMP, visit with me, and chemo chair for cycle 4 docetaxel on 3/28    Discussed with Dr. Cammy Yu,   PGY-IV  Hematology/Oncology Fellow

## 2022-03-02 ENCOUNTER — DOCUMENTATION ONLY (OUTPATIENT)
Dept: HEMATOLOGY/ONCOLOGY | Facility: CLINIC | Age: 62
End: 2022-03-02
Payer: MEDICARE

## 2022-03-02 NOTE — PROGRESS NOTES
Received request to set up home health services for patient. Patient has [facility] so referral sent to them. They will authorize and assign a vendor and notify the patient.